# Patient Record
Sex: FEMALE | Race: WHITE | NOT HISPANIC OR LATINO | Employment: FULL TIME | URBAN - METROPOLITAN AREA
[De-identification: names, ages, dates, MRNs, and addresses within clinical notes are randomized per-mention and may not be internally consistent; named-entity substitution may affect disease eponyms.]

---

## 2024-03-31 ENCOUNTER — HOSPITAL ENCOUNTER (OUTPATIENT)
Facility: CLINIC | Age: 54
Setting detail: OBSERVATION
Discharge: PSYCHIATRIC OR SUBSTANCE USE FACILITY | End: 2024-04-02
Attending: EMERGENCY MEDICINE | Admitting: NURSE PRACTITIONER
Payer: COMMERCIAL

## 2024-03-31 ENCOUNTER — TELEPHONE (OUTPATIENT)
Dept: BEHAVIORAL HEALTH | Facility: CLINIC | Age: 54
End: 2024-03-31

## 2024-03-31 DIAGNOSIS — F33.2 MDD (MAJOR DEPRESSIVE DISORDER), RECURRENT SEVERE, WITHOUT PSYCHOSIS (H): ICD-10-CM

## 2024-03-31 DIAGNOSIS — F41.1 GAD (GENERALIZED ANXIETY DISORDER): ICD-10-CM

## 2024-03-31 DIAGNOSIS — F43.10 PTSD (POST-TRAUMATIC STRESS DISORDER): ICD-10-CM

## 2024-03-31 DIAGNOSIS — R45.851 SUICIDAL IDEATION: ICD-10-CM

## 2024-03-31 LAB
ALBUMIN SERPL BCG-MCNC: 3.9 G/DL (ref 3.5–5.2)
ALP SERPL-CCNC: 83 U/L (ref 40–150)
ALT SERPL W P-5'-P-CCNC: 20 U/L (ref 0–50)
AMPHETAMINES UR QL SCN: NORMAL
ANION GAP SERPL CALCULATED.3IONS-SCNC: 10 MMOL/L (ref 7–15)
AST SERPL W P-5'-P-CCNC: 21 U/L (ref 0–45)
BARBITURATES UR QL SCN: NORMAL
BENZODIAZ UR QL SCN: NORMAL
BILIRUB SERPL-MCNC: 0.2 MG/DL
BUN SERPL-MCNC: 14.9 MG/DL (ref 6–20)
BZE UR QL SCN: NORMAL
CALCIUM SERPL-MCNC: 9 MG/DL (ref 8.6–10)
CANNABINOIDS UR QL SCN: NORMAL
CHLORIDE SERPL-SCNC: 104 MMOL/L (ref 98–107)
CREAT SERPL-MCNC: 1.14 MG/DL (ref 0.51–0.95)
DEPRECATED HCO3 PLAS-SCNC: 26 MMOL/L (ref 22–29)
EGFRCR SERPLBLD CKD-EPI 2021: 58 ML/MIN/1.73M2
ERYTHROCYTE [DISTWIDTH] IN BLOOD BY AUTOMATED COUNT: 12.6 % (ref 10–15)
FENTANYL UR QL: NORMAL
GLUCOSE SERPL-MCNC: 107 MG/DL (ref 70–99)
HCT VFR BLD AUTO: 36.6 % (ref 35–47)
HGB BLD-MCNC: 12.3 G/DL (ref 11.7–15.7)
MCH RBC QN AUTO: 32.7 PG (ref 26.5–33)
MCHC RBC AUTO-ENTMCNC: 33.6 G/DL (ref 31.5–36.5)
MCV RBC AUTO: 97 FL (ref 78–100)
OPIATES UR QL SCN: NORMAL
PCP QUAL URINE (ROCHE): NORMAL
PLATELET # BLD AUTO: 206 10E3/UL (ref 150–450)
POTASSIUM SERPL-SCNC: 3.9 MMOL/L (ref 3.4–5.3)
PROT SERPL-MCNC: 6.2 G/DL (ref 6.4–8.3)
RBC # BLD AUTO: 3.76 10E6/UL (ref 3.8–5.2)
SODIUM SERPL-SCNC: 140 MMOL/L (ref 135–145)
TSH SERPL DL<=0.005 MIU/L-ACNC: 4.13 UIU/ML (ref 0.3–4.2)
WBC # BLD AUTO: 7 10E3/UL (ref 4–11)

## 2024-03-31 PROCEDURE — G0378 HOSPITAL OBSERVATION PER HR: HCPCS

## 2024-03-31 PROCEDURE — 80053 COMPREHEN METABOLIC PANEL: CPT | Performed by: NURSE PRACTITIONER

## 2024-03-31 PROCEDURE — 84443 ASSAY THYROID STIM HORMONE: CPT | Performed by: NURSE PRACTITIONER

## 2024-03-31 PROCEDURE — 99285 EMERGENCY DEPT VISIT HI MDM: CPT

## 2024-03-31 PROCEDURE — 250N000013 HC RX MED GY IP 250 OP 250 PS 637: Performed by: NURSE PRACTITIONER

## 2024-03-31 PROCEDURE — 36415 COLL VENOUS BLD VENIPUNCTURE: CPT | Performed by: NURSE PRACTITIONER

## 2024-03-31 PROCEDURE — 99223 1ST HOSP IP/OBS HIGH 75: CPT | Performed by: NURSE PRACTITIONER

## 2024-03-31 PROCEDURE — 85027 COMPLETE CBC AUTOMATED: CPT | Performed by: NURSE PRACTITIONER

## 2024-03-31 PROCEDURE — 80307 DRUG TEST PRSMV CHEM ANLYZR: CPT | Performed by: NURSE PRACTITIONER

## 2024-03-31 RX ORDER — TRAZODONE HYDROCHLORIDE 100 MG/1
100 TABLET ORAL
Status: DISCONTINUED | OUTPATIENT
Start: 2024-03-31 | End: 2024-04-02 | Stop reason: HOSPADM

## 2024-03-31 RX ORDER — TRAZODONE HYDROCHLORIDE 100 MG/1
100 TABLET ORAL AT BEDTIME
COMMUNITY
Start: 2023-10-10

## 2024-03-31 RX ORDER — VENLAFAXINE HYDROCHLORIDE 150 MG/1
150 CAPSULE, EXTENDED RELEASE ORAL DAILY
COMMUNITY
Start: 2024-01-15

## 2024-03-31 RX ORDER — PROGESTERONE 100 MG/1
100 CAPSULE ORAL DAILY
COMMUNITY
Start: 2024-01-15 | End: 2025-01-14

## 2024-03-31 RX ORDER — TRAZODONE HYDROCHLORIDE 50 MG/1
50 TABLET, FILM COATED ORAL
Status: DISCONTINUED | OUTPATIENT
Start: 2024-03-31 | End: 2024-03-31

## 2024-03-31 RX ORDER — LEVOTHYROXINE SODIUM 125 UG/1
1 TABLET ORAL
COMMUNITY
Start: 2023-10-10

## 2024-03-31 RX ORDER — MIRTAZAPINE 7.5 MG/1
7.5 TABLET, FILM COATED ORAL AT BEDTIME
Status: DISCONTINUED | OUTPATIENT
Start: 2024-03-31 | End: 2024-04-02 | Stop reason: HOSPADM

## 2024-03-31 RX ORDER — ACETAMINOPHEN 325 MG/1
650 TABLET ORAL EVERY 6 HOURS PRN
Status: DISCONTINUED | OUTPATIENT
Start: 2024-03-31 | End: 2024-04-02 | Stop reason: HOSPADM

## 2024-03-31 RX ORDER — HYDROXYZINE HYDROCHLORIDE 25 MG/1
25 TABLET, FILM COATED ORAL EVERY 6 HOURS PRN
Status: DISCONTINUED | OUTPATIENT
Start: 2024-03-31 | End: 2024-04-02 | Stop reason: HOSPADM

## 2024-03-31 RX ORDER — VENLAFAXINE HYDROCHLORIDE 75 MG/1
75 CAPSULE, EXTENDED RELEASE ORAL DAILY
COMMUNITY
Start: 2024-01-15 | End: 2025-01-14

## 2024-03-31 RX ORDER — ESTRADIOL 0.07 MG/D
1 FILM, EXTENDED RELEASE TRANSDERMAL
COMMUNITY
Start: 2024-01-15 | End: 2025-01-14

## 2024-03-31 RX ORDER — MINOXIDIL 2.5 MG/1
1.25 TABLET ORAL DAILY
COMMUNITY

## 2024-03-31 RX ORDER — PROGESTERONE 100 MG/1
100 CAPSULE ORAL AT BEDTIME
Status: DISCONTINUED | OUTPATIENT
Start: 2024-03-31 | End: 2024-04-02 | Stop reason: HOSPADM

## 2024-03-31 RX ORDER — IBUPROFEN 600 MG/1
600 TABLET, FILM COATED ORAL EVERY 6 HOURS PRN
Status: DISCONTINUED | OUTPATIENT
Start: 2024-03-31 | End: 2024-04-02 | Stop reason: HOSPADM

## 2024-03-31 RX ADMIN — ACETAMINOPHEN 650 MG: 325 TABLET, FILM COATED ORAL at 16:06

## 2024-03-31 RX ADMIN — MIRTAZAPINE 7.5 MG: 7.5 TABLET, FILM COATED ORAL at 21:35

## 2024-03-31 RX ADMIN — PROGESTERONE 100 MG: 100 CAPSULE ORAL at 21:35

## 2024-03-31 RX ADMIN — MINOXIDIL 1.25 MG: 2.5 TABLET ORAL at 21:35

## 2024-03-31 RX ADMIN — VENLAFAXINE HYDROCHLORIDE 225 MG: 150 CAPSULE, EXTENDED RELEASE ORAL at 21:35

## 2024-03-31 ASSESSMENT — ACTIVITIES OF DAILY LIVING (ADL)
ADLS_ACUITY_SCORE: 35

## 2024-03-31 ASSESSMENT — COLUMBIA-SUICIDE SEVERITY RATING SCALE - C-SSRS
1. IN THE PAST MONTH, HAVE YOU WISHED YOU WERE DEAD OR WISHED YOU COULD GO TO SLEEP AND NOT WAKE UP?: YES
6. HAVE YOU EVER DONE ANYTHING, STARTED TO DO ANYTHING, OR PREPARED TO DO ANYTHING TO END YOUR LIFE?: NO
5. HAVE YOU STARTED TO WORK OUT OR WORKED OUT THE DETAILS OF HOW TO KILL YOURSELF? DO YOU INTEND TO CARRY OUT THIS PLAN?: YES
2. HAVE YOU ACTUALLY HAD ANY THOUGHTS OF KILLING YOURSELF IN THE PAST MONTH?: YES
3. HAVE YOU BEEN THINKING ABOUT HOW YOU MIGHT KILL YOURSELF?: YES
4. HAVE YOU HAD THESE THOUGHTS AND HAD SOME INTENTION OF ACTING ON THEM?: NO

## 2024-03-31 NOTE — ED NOTES
Patient brought over from ED triage.  She is here with suicidal ideation with a plan to overdose or drive the car into a bridge. She states she has felt this way for the last 2 months but something changed last Thursday where she did not think about her children any longer. She felt the depression and suicidal ideation became more acute. She also states that she has had chronic suicidal ideation since she was a child. She lives with her 11 and 13 year old children and her  is often out of town for work and will not be back until tomorrow afternoon. She felt like she could not wait until then. She does not have her own therapist.  Her primary care MD prescribes her medications.  She is pleasant and cooperative. She is tearful, depressed and anxious. Nursing and risk assessments completed.  Assessments reviewed with LMHP and physician. Video monitoring in progress, patient informed.  Admission information reviewed with patient. Patient given a tour of [facility] and instructions on using the facility. Questions regarding [facility] addressed. Pt search completed and belongings inventoried.

## 2024-03-31 NOTE — ED TRIAGE NOTES
Comes into with SI with plan (drive into something, or over dose on meds she has at home), today she stated that she had been down and depressed for about 2 months but has been SI for years with no history of attempts.  She is her with her brother and is seeking EMPATH, she is tearful in triage.     *She fell on Friday and injured her back while walker her dog.       Triage Assessment (Adult)       Row Name 03/31/24 1401          Triage Assessment    Airway WDL WDL        Respiratory WDL    Respiratory WDL WDL        Cardiac WDL    Cardiac WDL WDL        Cognitive/Neuro/Behavioral WDL    Cognitive/Neuro/Behavioral WDL WDL                   [facility]  ED to EMPATH Checklist:      Goal for EMPATH: Depression management, Anxiety management, Suicidality, and Medication management    Current Behavior: Anxious    Safety Concerns: Suicidal, with a plan to drive rher car into something, or overdose with meds she has    Legal Hold Status: Voluntary    Medically Cleared by ED provider: Yes    Patient Therapeutically Searched: Not searched - Currently in triage    Belongings: Remain with patient    Independent Ambulation at Baseline: Yes/No: Yes    Participates in Care/Conversation: Yes/No: Yes    Patient Informed about EMPATH: Yes/No: Yes    DEC: Ordered and pending    Patient Ready to be Transferred to EMPATH? Yes/No: Yes

## 2024-03-31 NOTE — ED PROVIDER NOTES
[facility] Unit - Initial Psychiatric Observation Note  [facility] Emergency Department  Observation Initiation Date: Mar 31, 2024    Eduarda Pearl MRN: [id number]   Age: 53 year old YOB: 1970     History     Chief Complaint   Patient presents with    Suicidal     HPI  Eduarda Pearl is a 53 year old female with a past history notable for major depressive disorder, generalized anxiety disorder, PTSD related to childhood trauma, and binge-restrict eating disorder. Patient presented to the emergency department for evaluation of worsening symptoms of depression and PTSD, leading to thoughts of suicide with a plan to drive her car off a bridge or overdose on medication. Patient was medically evaluated in the emergency department and determined to be medically stable for transfer to [facility] for further psychiatric assessment. Patient is nearing 3 hours in emergency care.     Here at [facility], patient describes an incremental worsening of mood symptoms since around October 2023. She reports that she has felt increasingly depressed, sad, with low self-esteem, feelings of worthlessness, hopelessness, decreased concentration, anhedonia. She is restricting her intake more frequently. Reports she has been receiving Ozempic injections, which has naturally decreased her appetite over the past year. She describes history of binge-restrict eating pattern, for which she received treatment at [facility] in 2018. She notes a history of PTSD symptoms related to significant childhood trauma, and feels her symptoms have been resurfacing recently. Her father is apparently at the end of his life, and this has brought up some difficult memories. She endorses increased hypervigilance, intrusive memories, flashbacks, and more frequent nightmares. She endorses difficulty falling and staying asleep, reporting she is waking up around 0300 each night with racing thoughts and anxiety. She eventually falls back to sleep, but will then  "experience nightmares. She wakes up feeling anxious and not well-rested. She has begun to experience active suicidal thoughts, thinking about ways she could end her life, such as overdosing or driving car off a structure. Her children previously served as her reason to continue living, but this desire to live for them has been waning. Her  works as a  and is currently out of town. She has been prescribed venlafaxine for the past 4-5 years, which has generally been effective, but recently has not been adequately managing symptoms. Pt is agreeable to inpatient psychiatric hospitalization for further stabilization.     Past Medical History  No past medical history on file.  No past surgical history on file.  estradiol (VIVELLE-DOT) 0.075 MG/24HR BIW patch  levothyroxine (SYNTHROID/LEVOTHROID) 125 MCG tablet  minoxidil (LONITEN) 2.5 MG tablet  progesterone (PROMETRIUM) 100 MG capsule  Semaglutide, 1 MG/DOSE, (OZEMPIC) 4 MG/3ML pen  traZODone (DESYREL) 100 MG tablet  venlafaxine (EFFEXOR XR) 150 MG 24 hr capsule  venlafaxine (EFFEXOR XR) 75 MG 24 hr capsule      Allergies   Allergen Reactions    Erythromycin     Sulfa Antibiotics Unknown     Family History  No family history on file.  Social History       Past medical history, past surgical history, medications, allergies, family history, and social history were reviewed with the patient. No additional pertinent items.       Review of Systems  A medically appropriate review of systems was performed with pertinent positives and negatives noted in the HPI, and all other systems negative.    Physical Examination   BP: (!) 155/89  Pulse: 97  Temp: 97.7  F (36.5  C)  Resp: 12  Height: 160 cm (5' 3\")  Weight: 95.3 kg (210 lb)  SpO2: 99 %    Physical Exam  General: Appears stated age.   Neuro: Alert and fully oriented. Extremities appear to demonstrate normal strength on visual inspection.   Integumentary/Skin: no rash visualized, normal color    Psychiatric " Examination   Appearance: awake, alert, adequately groomed, appeared as age stated, and casually dressed  Attitude:  cooperative  Eye Contact:  fair  Mood:  depressed  Affect:  mood congruent and intensity is normal  Speech:  clear, coherent and normal prosody  Psychomotor Behavior:  no evidence of tardive dyskinesia, dystonia, or tics  Thought Process:  linear and goal oriented  Associations:  no loose associations  Thought Content:  no evidence of psychotic thought, active suicidal ideation present, no auditory hallucinations present, no visual hallucinations present, and no homicidal thinking  Insight:  fair  Judgement:  fair  Oriented to:  time, person, and place  Attention Span and Concentration:  fair  Recent and Remote Memory:  intact  Language: able to name/identify objects without impairment  Fund of Knowledge: intact with awareness of current and past events    ED Course        Labs Ordered and Resulted from Time of ED Arrival to Time of ED Departure - No data to display    Assessments & Plan (with Medical Decision Making)   Patient presenting with suicidal thinking in the context of worsening depressive symptoms, along with an exacerbation of PTSD symptoms, leading to more difficulty functioning. Protective mechanisms, such as her children, have become less of a consideration. Has been treated with venlafaxine for the last several years, which has not been adequately managing symptoms. She is willing to transition to inpatient psychiatry for further stabilization.  Nursing notes reviewed noting no acute issues.     I have reviewed the assessment completed by the [facility].     During the observation period, the patient did not require medications for agitation, and did not require restraints/seclusion for patient and/or provider safety.     The patient was found to have a psychiatric condition that would benefit from an observation stay in the emergency department for further psychiatric stabilization and/or  coordination of a safe disposition. The observation plan includes serial assessments of psychiatric condition, potential administration of medications if indicated, further disposition pending the patient's psychiatric course during the monitoring period.     Preliminary diagnosis:    ICD-10-CM    1. Suicidal ideation  R45.851       2. MDD (major depressive disorder), recurrent severe, without psychosis (H)  F33.2       3. PTSD (post-traumatic stress disorder)  F43.10       4. VASU (generalized anxiety disorder)  F41.1            Treatment Plan:  - Continue venlafaxine  mg daily for treatment of anxiety and depressive disorders  - Trial mirtazapine 7.5 mg at bedtime for sleep, appetite, mood augmentation  - Change trazodone from nightly to PRN. Pt has not found this to be effective recently.   - Hydroxyzine 25 mg q6h prn for anxiety  - Alternate acetaminophen and ibuprofen for back pain  - CBC, CMP, TSH, and utox ordered to help facilitate inpatient admission  - Patient will be registered to observation status. Plan for transfer to inpatient psychiatry when a bed is available.       --  Manuel Haas CNP   [facility] EMERGENCY DEPT  [facility] Unit      Manuel Haas CNP  03/31/24 4488

## 2024-03-31 NOTE — ED PROVIDER NOTES
History     Chief Complaint:  Suicidal       HPI   Eduarda Pearl is a 53 year old female with history of anxiety and depression presenting today with suicidal ideations.  She states that she has had ongoing depression that she has been addressing with her primary care doctor.  She states that she has had increasing suicidal ideations over the last 2 months.  She states that she has had suicidal plan including drug overdose and driving into something.  She states that she has 2 children at home and 11-year-old and a 13-year-old.  Her  works as a  and is currently out of town.  She states that she was trying to hold off until her  was home however her symptoms became too severe.  She was brought in by her brother.  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  She reports insomnia at night has been taking trazodone with no improvement.  She is currently on Ozempic and has been taking it over the last year and a half and states that she has baseline decreased appetite with that.  She denies any daily alcohol use.  Reports occasional marijuana use.  Her father has a history of depression and states that her mother  from plastic surgery when she was 19 years old.  She has had no prior [facility] admissions or inpatient psych admissions.      Independent Historian:   None - Patient Only    Review of External Notes:   Last visit with PCP was in January 15, 2024 patient diagnosed with anxiety, hypothyroidism.      Medications:    No current outpatient medications on file.      Past Medical History:    No past medical history on file.    Past Surgical History:    No past surgical history on file.     Physical Exam   Patient Vitals for the past 24 hrs:   BP Pulse Resp SpO2 Weight   24 1400 (!) 155/89 97 12 99 % 95.3 kg (210 lb)        Physical Exam  Vitals reviewed.   Constitutional:       General: She  is not in acute distress.     Appearance: She is not ill-appearing.   HENT:      Head: Normocephalic and atraumatic.   Eyes:      Extraocular Movements: Extraocular movements intact.   Cardiovascular:      Rate and Rhythm: Normal rate.   Pulmonary:      Effort: Pulmonary effort is normal. No respiratory distress.   Musculoskeletal:      Cervical back: Normal range of motion.   Skin:     General: Skin is warm and dry.   Neurological:      Mental Status: She is alert and oriented to person, place, and time.      GCS: GCS eye subscore is 4. GCS verbal subscore is 5. GCS motor subscore is 6.   Psychiatric:      Comments: Suicidal ideations with plan. No hallucinations.           Emergency Department Course     Imaging:  No orders to display          Laboratory:  Labs Ordered and Resulted from Time of ED Arrival to Time of ED Departure - No data to display     Procedures       Emergency Department Course & Assessments:    Interventions:  Medications - No data to display            Social Determinants of Health affecting care:   None    Disposition:  The patient was transferred to [facility].     Impression & Plan      Medical Decision Makin-year-old female history of anxiety and depression presenting today with suicidal ideations.  She has history of major depressive disorder has been on medications as prescribed by her PCP.  She states that over the last 2 months she has had worsening symptoms.  She states that she currently has suicidal ideations with a plan to overdose on drugs or drive into something.  She states she has no prior inpatient psych admissions.  At this time she is tearful.  [facility]odynam[facility]      Diagnosis:    ICD-10-CM    1. Suicidal ideation  R45.851                Fabiola Clark DO  3/31/2024   Fabiola Clark DO Doan, Tiffani, DO  [phone]

## 2024-03-31 NOTE — TELEPHONE ENCOUNTER
S: JESSICA Ahumada  Alisson  calling at 5:34 PM about a 53 year old/Female presenting with acute SI with plans to overdose on meds or crash her car     B: Pt arrived via Family. Presenting problem, stressors: hx of childhood trauma that recently resurfaced on visit their mom's side of the family, father dying of cancer, very tumltuous work life    Pt affect in ED: Flat  Pt Dx: Major Depressive Disorder, Generalized Anxiety Disorder, and PTSD  Previous [facility] hx? No  Pt endorses SI with a plan to overdose or crash car     Hx of suicide attempt? No  Pt denies SIB  Pt denies HI   Pt denies hallucinations .   Pt RARS Score: 3    Hx of aggression/violence, sexual offenses, legal concerns, Epic care plan? describe: No  Current concerns for aggression this visit? No  Does pt have a history of Civil Commitment? No  Is Pt their own guardian? Yes    Pt is prescribed medication. Is patient medication compliant? Yes  Pt endorses OP services: Medication Management  CD concerns: None  Acute or chronic medical concerns: No  Does Pt present with specific needs, assistive devices, or exclusionary criteria? None      Pt is ambulatory  Pt is able to perform ADLs independently      A: Pt to be reviewed for [facility] admission. Pt is Voluntary  Preferred placement: [facility]    COVID Symptoms: No  If yes, COVID test required   Utox: Ordered, not yet collected   CMP: Ordered, not yet collected   CBC: Ordered, not yet collected   HCG: N/A    R: Patient cleared and ready for behavioral bed placement: Yes  Pt placed on [facility] worklist? Yes    Does Patient need a Transfer Center request created? Yes, writer completed Transfer Center request at:

## 2024-03-31 NOTE — ED NOTES

## 2024-03-31 NOTE — TELEPHONE ENCOUNTER
R: [place]  Access Inpatient Bed Call Log  3/31/2024 3:34 PM  Intake has called facilities that have not updated their bed status within the last 12 hours.??      ADULTS:     *[facility]  [place] -- [facility]: @ cap per website.  [place] -- [facility]:  @ cap per website.  [place] -- [facility]: @ Posting 1 bed.  [place] -- [facility]: @ cap per website. -3:35 PM Per Tess, they are capped.  [place] -- [facility]: @ Cap per website.  [facility] -- [facility]: @ cap per website.  [place] -- [facility]/YA beds @ Posting 1 beds. Ages 18-28, Voluntary only, COVID test req'd, NO aggression, physical or sexual assault, violence hx or drug abuse, or psychosis  [place] -- [facility]: @ Cap per website.  [place] -- [facility]: @ cap per website.  [place] -- [facility]:  @ Cap per website. - 12:16 AM Per Lorenzo they are at capacity.    Pt remains on waitlist pending appropriate placement availability.
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
## 2024-04-01 ENCOUNTER — TELEPHONE (OUTPATIENT)
Dept: BEHAVIORAL HEALTH | Facility: CLINIC | Age: 54
End: 2024-04-01
Payer: COMMERCIAL

## 2024-04-01 VITALS
HEIGHT: 63 IN | RESPIRATION RATE: 14 BRPM | DIASTOLIC BLOOD PRESSURE: 86 MMHG | OXYGEN SATURATION: 96 % | SYSTOLIC BLOOD PRESSURE: 133 MMHG | WEIGHT: 218 LBS | HEART RATE: 81 BPM | TEMPERATURE: 98 F | BODY MASS INDEX: 38.62 KG/M2

## 2024-04-01 PROCEDURE — 250N000013 HC RX MED GY IP 250 OP 250 PS 637: Performed by: NURSE PRACTITIONER

## 2024-04-01 PROCEDURE — G0378 HOSPITAL OBSERVATION PER HR: HCPCS

## 2024-04-01 RX ORDER — ESTRADIOL 0.07 MG/D
1 FILM, EXTENDED RELEASE TRANSDERMAL
Status: DISCONTINUED | OUTPATIENT
Start: 2024-04-01 | End: 2024-04-02 | Stop reason: HOSPADM

## 2024-04-01 RX ADMIN — MIRTAZAPINE 7.5 MG: 7.5 TABLET, FILM COATED ORAL at 22:02

## 2024-04-01 RX ADMIN — ESTRADIOL 1 PATCH: 0.07 FILM, EXTENDED RELEASE TRANSDERMAL at 22:03

## 2024-04-01 RX ADMIN — VENLAFAXINE HYDROCHLORIDE 225 MG: 150 CAPSULE, EXTENDED RELEASE ORAL at 22:02

## 2024-04-01 RX ADMIN — PROGESTERONE 100 MG: 100 CAPSULE ORAL at 22:02

## 2024-04-01 RX ADMIN — HYDROXYZINE HYDROCHLORIDE 25 MG: 25 TABLET, FILM COATED ORAL at 22:22

## 2024-04-01 RX ADMIN — MINOXIDIL 1.25 MG: 2.5 TABLET ORAL at 22:03

## 2024-04-01 RX ADMIN — LEVOTHYROXINE SODIUM 125 MCG: 75 TABLET ORAL at 09:08

## 2024-04-01 RX ADMIN — HYDROXYZINE HYDROCHLORIDE 25 MG: 25 TABLET, FILM COATED ORAL at 12:45

## 2024-04-01 RX ADMIN — ACETAMINOPHEN 650 MG: 325 TABLET, FILM COATED ORAL at 22:14

## 2024-04-01 NOTE — ED NOTES
Patient slept late.  Patient said she slept well.  She states she feels worn out and just feels numb.  When asked about if she was having suicidal thoughts she said she is too numb to think about it. She is wondering how things are going at home but is happy not have the responsibility of home.  She states she is feeling at edge.  She was given 25mg of hydroxyzine and now states she feels relaxed.

## 2024-04-01 NOTE — PROGRESS NOTES
"Triage & Transition Services, Extended Care     Therapy Progress Note    Patient: Eduarda goes by \"Eduarda,\" uses she/her pronouns  Date of Service: April 1, 2024  Site of Service: [facility] EMERGENCY DEPT                             EMP14  Patient was seen yes  Mode of Assessment: In person    Presentation Summary: Pt tells this writer that she has been sleeping excessively throughout the day and did not know that she could sleep that much. She reports feeling numb like she is \"in a little pod observing the world around her\". This writer provided psychoeducation to pt about derealization, which resonated with her. She continues to feel acutely suicidal and is unable to identify reasons to continue living. She reports feeling very \"distant\" and \"far away\" from her  and children. She tells this writer that her relationships with her children are changing as they move into adolescence and, in some ways, she does not feel that they need her anymore. She has been continuing to think of ways to end her life and has a new suicidal plan to drown herself in a lake like a neighbor did. She tells this writer that it is \"too overwhelming to sit with the pain\". Pt has had conversations with her brother and  while here. She feels well supported by her brother and notes feeling \"awful\" around how her  has responded to her being in the hospital. She tells this writer that she feels \"invalidated\" by his responses to her pain. Pt continues to be agreeable to voluntary admission.    Therapeutic Intervention(s) Provided: Engaged in guided discovery, explored patient's perspectives and helped expand them through socratic dialogue.    Current Symptoms: anxious, racing thoughts, excessive worry negativistic, crying or feels like crying, low self esteem, helplessness, hoplessness, sadness, thoughts of death/suicide racing thoughts, excessive worry, anxious        Mental Status Exam   Affect: " Flat  Appearance: Appropriate  Attention Span/Concentration: Attentive  Eye Contact: Engaged    Fund of Knowledge: Appropriate   Language /Speech Content: Fluent  Language /Speech Volume: Normal  Language /Speech Rate/Productions: Normal  Recent Memory: Intact  Remote Memory: Intact  Mood: Anxious, Depressed, Sad  Orientation to Person: Yes   Orientation to Place: Yes  Orientation to Time of Day: Yes  Orientation to Date: Yes     Situation (Do they understand why they are here?): Yes  Psychomotor Behavior: Normal  Thought Content: Suicidal  Thought Form: Obsessive/Perseverative    Treatment Objective(s) Addressed: processing feelings, assessing safety    Patient Response to Interventions: acceptance expressed, verbalizes understanding    Progress Towards Goals: Patient Reports Symptoms Are: ongoing  Patient Progress Toward Goals: is not making progress  Comment: Pt continues to report suicidal ideation and has a new plan today to drown herself in a lake.  Next Step to Work Toward Discharge: symptom stabilization  Symptom Stabilization Comment: Pt continues to report suicidal ideation and has a new plan today to drown herself in a lake. She is unable to identify any deterrents to suicide.    Case Management: No case management completed today.     Plan: inpatient mental health  yes provider, SIMONE Vega, in agreement  yes    Clinical Substantiation: It is the recommendation of this writer that pt be admitted to an inpatient psychiatric unit on the basis of her suicidal plans with intent to overdose on medication or crash her car. She also has a new suicidal plan today to drown herself in a lake. Pt is unable to identify any deterrents to suicide at this point in time, including her children. Pt is voluntary for inpatient hospitalization.    Legal Status: Legal Status at Admission: Voluntary/Patient has signed consent for treatment    Session Status: Time session started: 1625  Time session ended: 1645  Session  Duration (minutes): 20 minutes  Session Number: 1  Anticipated number of sessions or this episode of care: 3    Time Spent: 20 minutes    CPT Code: CPT Codes: 65097 - Psychotherapy (with patient) - 30 (16-37*) min    Diagnosis:   Patient Active Problem List   Diagnosis Code    Suicidal ideation R45.851    Severe recurrent major depression without psychotic features (H) F33.2    Posttraumatic stress disorder F43.10    Generalized anxiety disorder F41.1       Primary Problem This Admission: Active Hospital Problems    Suicidal ideation      Severe recurrent major depression without psychotic features (H)      Posttraumatic stress disorder      Generalized anxiety disorder        Alisson Teixeira [facility]   Licensed Mental Health Professional (LMHP), [facility] Care  [phone]

## 2024-04-01 NOTE — PROGRESS NOTES
Triage & Transition Services, Extended Care     Client Name: Eduarda Pearl    Date: March 31, 2024    Patient was seen    Mode of Assessment:      Service Type: (P) other (see comments) (pt declined to attend, expressed need for sleep, expressed openness to group tomorrow)  Session Start Time:  (P) 1840    Session End Time: (P) 1910  Session Length: (P) 30  Site Location: [facility] EMERGENCY DEPT                             EMP14  Total Number ofAttendees: (P) 1  Topic:   (P) coping skills/lifestyle management   Response: (P) other (see comments) (pt declined to participate)     Alisson Teixeira [facility]   Licensed Mental Health Professional (LMHP), Extended Care  [phone]

## 2024-04-01 NOTE — TELEPHONE ENCOUNTER
[place]: [place]  Access Inpatient Bed Call Log  4/1/24 @ 1:00am   Intake has called facilities that have not updated their bed status within the last 12 hours.??     *METRO:  [facility] -- [facility]: @ cap per website.  [facility]:  @ cap per website.  [facility] -- [facility]: @ cap per website.  [place] -- [facility]: @ cap per website. Low acuity only.  [place] -- [facility]: @ cap per website.  [facility] -- [facility]: @ cap per website.   [facility]/ beds - POSTING 1 BED. Ages 18-28, Voluntary only, NO aggression/physical/sexual assault, violence hx or drug abuse, or psychosis. Negative Covid.   [facility] -- [facility]: @ cap per website.  [place] [place] -- [facility]: @ cap per website.  [place] -- [facility]: @ cap per website. Not reviewing until 10AM.     Pt remains on waitlist pending appropriate placement availability

## 2024-04-01 NOTE — TELEPHONE ENCOUNTER
[place]: [place]  Access Inpatient Bed Call Log 4/1/24 7:30 AM    Intake has called facilities that have not updated the bed status within the last 12 hours.                 Adults:           [facility] is at capacity            [facility] is posting 0 beds. [phone]    [facility] is posting 0 bed. Negative covid required. [phone]        [facility] is posting 0 beds. Neg covid. No high school/[facility]. [phone] Per call at 7:39a to [facility].   [facility] is posting 0 beds. [phone]   [facility] is posting 0 beds. [phone]    [facility] is posting 1 bed. Negative covid. [phone]. Per call at 7:40a to [facility] 1 child, handful of adolescents and 1 YA bed available.   [facility] [place] ([facility]) is posting 0 bed [phone]     Pt remains on the work list pending appropriate bed availability.      1:35 PM Paged Carmela to review pt for [facility]  2:48 PM 2nd page to Carmela   2:58 PM Carmela called. He will review pt and call back.   3:07 PM Carmela accepted pt for admission to [facility].   3:39 PM Called [facility]. ELLYN still in report. Left a message to have them call intake.   4:01 PM Called [facility] and spoke with CRN to inform of pt in queue. He states that there are two admissions ahead of pt, one being an acute 2:1. He reports he will review pt and assess if he will be able to admit this shift.   4:10 PM Called [facility] and provided pt placement information

## 2024-04-01 NOTE — PROGRESS NOTES
Pt has been calm, pleasant and cooperative all shift. Pt was isolative and withdrawn. She spent most of the shift resting in her recliner watching TV. Pt reports some feeling of SI with no plan. Pt was compliant with medications and cares. Plan is to go IP. Pt  is agreeable with the plan.

## 2024-04-01 NOTE — H&P
"  ----------------------------------------------------------------------------------------------------------  [facility]   Psychiatry History and Physical    Name: Eduarda Pearl   MRN#: [id number]  Age: 53 year old YOB: 1970    Date of Admission:  24  Attending Physician: Dr. Adri Hendricks     Contacts:     Primary Outpatient Psychiatrist: None per chart review  Primary Physician: Starla Cramer  Therapist: None per chart review  [facility] : None per chart review  Probation/: None per chart review  Family Members: Jase Fox (brother) [phone]            Twan Pearl () [phone]     Chief Concern:     \"Suicidal Ideation\"     History of Present Illness:     Eduarda Pearl is a 53 year old female with previous psychiatric diagnoses of MDD, VASU, PTSD, and binge-restrict eating disorder admitted from the [facility] on 2024 due to concern for SI in the context of psychosocial stressors including work related stress, sick family members .    ED Provider Note:  Eduarda Pearl is a 53 year old female with history of anxiety and depression presenting today with suicidal ideations.  Patient has had ongoing depression that she has been addressing with her primary care doctor, but worsening over the last two months.  She states that she has had suicidal plan including drug overdose and driving into something.  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  She reports insomnia at night has been taking trazodone with no improvement.  She denies any daily alcohol use.  Reports occasional marijuana use.  Her father has a history of depression and states that her mother  from plastic surgery when she was 19 years old.  She has had no prior empath admissions or inpatient psych admissions.  Per ED MD H&P.  See ED note from 3/31/24 " "for more details.    [facility]/DEC Assessment:  Eduarda Pearl presents to the ED with with her brother for worsening suicidal ideation, Depression, Anxiety.  She reported often has \"low grade depression\" that began to significantly worsen in October 2023. She identifies multiple psychosocial stressors including: experiencing menopause, an unpredictable workplace, a new job with increased demands for less pay, and spending time with her mother's side of the family which resurfacing childhood trauma. Her father is also dying of prostate cancer, which has further increased PTSD symptoms related to her childhood trauma.  On Thursday (3/28/24), she started to have intense thoughts about how \"easy\" it would be to end her life and drove to her brother's house for help. Her brother and sister in-law have been monitoring her SI and encouraged her to seek help today. She has suicidal plans with intent to overdose on medication or crash her car. She has been researching this week what combination of medications she would need to take for a suicide attempt to be lethal. She also has been thinking about driving fast down a remote road so as to not hurt anyone else and then crashing into a barricade. She also has a fantasy of putting on a fur coat, combining barbiturates with vodka, and then floating away on an ice sheet in the [place], letting hypothermia take her. She is unable to identify any deterrents to suicide at present, including her children. She talks at length about how she cannot \"handle the pain\" anymore and does not think that life will ever get better. Pt endorses PTSD symptoms of \"horrific nightmares\", flashbacks, diassociation, and hypervigilance. She reports racing thoughts without associated panic attacks. She has been having significant difficulty sleeping, falling asleep at 1 am and then waking up between 3 and 5 am. She has a history of binging and restricting and has been restricting more frequently. She denies " "SARAHY but reports using marijuana on a monthly basis or less. She denies HI.  Collateral information was obtained from her brother, Jase Fox:  Pt has had significant depressive symptoms since at least 3/28/24 and her family decided to seek help for her today.  Pt has struggled with depression for a long time but Jase has never seen pt \"as bad as she has been\" since Thursday (3/28/24). She has been crying uncontrollably and telling us that we would be better off without her. She has not disclosed a suicidal plan.  No SARAHY concerns. Pt has made comments about killing herself.  Per 3/31/24 DEC assessment by [facility].  See note from 3/31/24 for more details.    ED/Hospital Course:  Eduarda Pearl was medically cleared for admission to inpatient psychiatric unit. In the ED, she was calm and cooperative with staff with continued SI and required only her routine medications.    Patient interview:  Pt interviewed in her room today with treatment team. She is \"ok\" today. Denies SI today. Has had SI on and off throughout life, no prior attempts. Effexor for many years, previously tried cymbalta and wellbutrin. Wellbutrin was stopped because she was crying all the time. Feels her depressive episode has been gradual in onset. Tried to decrease effexor dose but had recurrent depressive symptoms. Feels effexor is helpful but dont resolve her depression. Started on effexor 10/2020.     Since having a covid infection, feels she has not recovered fully, and this has worsened her depression. Work is stressful. 2 kids 11 and 14,  is gone frequently as a . Emotional needs of the family fall on her shoulders. Work stress was a trigger.    Works at [employer], she loves job but she received an email in October 2023 that was upsetting and then multiple changes at work happened, she feels her depressive symptoms have been slowly getting worse since then.     Endorses anxiety, racing thoughts, ruminations. Tried ativan " "after covid infection triggered her anxiety. Has ativan at home but rarely takes it. Effexor does not help anxiety.     She went on vacation to [place] last week and she went to see mom's side of the family which was an additional trigger for her because her mom  with she was 18.     History of eating disorder, binging and restricting, received treatment [id number]. Started on ozempic, binging behavior has decreased with this med but still present. Binging behavior is more related to stress and anxiety, vs her depression.     Goals for hospitalization would be med stabilization and intensive outpatient therapy. Wants to address her PTSD as this is a trigger and impacts her ability to regulate her emotions. Endorses nightmares related to past trauma, happening \"a lot\" right now, when she is \"doing ok\" she will have nightmares about once a month, currently happening every night right now, and this wakes her from sleep. Denies avoiding emotional triggers. Sensitivity to loud noises, easily startled, hypervigilent in awareness of surroundings. Low self esteem and body dysmorphia, has struggled with this whole life.     Support -  (Twan), 2 kids, brother, sister in law, best friend.  is home with kids now. Brother is going to help watch kids when  goes back to work. Doing couples counseling with . Last CBT .    Verbal CHRISTA for her PCP.      Psychiatric Review of Systems:    All negative except what is noted in the HPI.      Medical Review of Systems:   The Review of Systems is negative other than what is noted in the HPI.     Psychiatric History:     Prior diagnoses: Previous psychiatric diagnoses include MDD, VASU, PTSD, and binge-restrict eating disorder.    Hospitalizations: None.    Court Commitments: None per Chart Review    Suicide attempts: None per Chart Review.    Self-injurious behavior: None per Chart Review.    Violence towards others: None per Chart Review.    ECT/TMS: None " per Chart Review.    Past medications:   Per Chart Review:  Trazodone  Effexor XR  Ativan 0.5mg every day prn   Cymbalta   Wellbutrin    Substance Use History:     Alcohol: Endorses Social.      Nicotine: Denies    Cannabis: Endorses, last use 2/21/24    Illicit Substances: Denies  current or past addiction    Chemical Dependency Treatment: Denies history of chemical dependency treatment      Social History:     Family/Relationships:     Living Situation: Home    Education: Not formally assessed today.    Occupation: Works full-time in sales for [employer].    Legal: Denies history of legal issues.     Guns: Not formally assessed today.    Abuse/Trauma: Endorses history of child trauma        Past Medical/Surgical History:     I have reviewed this patient's past medical history  No past medical history on file.    This patient has no significant past surgical history  No past surgical history on file.     Family History:     Psychiatric Family Hx: Depression: father  No family history on file.     Allergies:      Allergies   Allergen Reactions     Erythromycin      Sulfa Antibiotics Unknown        Medications:   Estradiol 0.075mg/Hr Patch Thursday and Monday morning  Levothyroxine 125mcg once daily  Minoxidil 2.5mg once daily  Progesterone 100mg once daily  Semaglutide 1mg/dose every 7 days  Trazodone 100mg at bedtime  Venlafaxine 150mg 24hr daily  Venlafaxine 75mg 24hr daily  See current inpatient medications below.     Vitals and Physical Exam:     BP (!) 136/93 (BP Location: Right arm, Patient Position: Sitting, Cuff Size: Adult Large)   Pulse 71   Temp 97.4  F (36.3  C) (Oral)   Resp 16   Wt 97.9 kg (215 lb 14.4 oz)   SpO2 100%   BMI 38.24 kg/m      See ED assessment note by ED physician on 3/31/24.     Labs and Imaging:     Recent Results (from the past 72 hour(s))   Urine Drug Screen Panel    Collection Time: 03/31/24  6:47 PM   Result Value Ref Range    Amphetamines Urine Screen Negative Screen  Negative    Barbituates Urine Screen Negative Screen Negative    Benzodiazepine Urine Screen Negative Screen Negative    Cannabinoids Urine Screen Negative Screen Negative    Cocaine Urine Screen Negative Screen Negative    Fentanyl Qual Urine Screen Negative Screen Negative    Opiates Urine Screen Negative Screen Negative    PCP Urine Screen Negative Screen Negative   TSH with free T4 reflex    Collection Time: 03/31/24  8:53 PM   Result Value Ref Range    TSH 4.13 0.30 - 4.20 uIU/mL   CBC with platelets    Collection Time: 03/31/24  8:53 PM   Result Value Ref Range    WBC Count 7.0 4.0 - 11.0 10e3/uL    RBC Count 3.76 (L) 3.80 - 5.20 10e6/uL    Hemoglobin 12.3 11.7 - 15.7 g/dL    Hematocrit 36.6 35.0 - 47.0 %    MCV 97 78 - 100 fL    MCH 32.7 26.5 - 33.0 pg    MCHC 33.6 31.5 - 36.5 g/dL    RDW 12.6 10.0 - 15.0 %    Platelet Count 206 150 - 450 10e3/uL   Comprehensive metabolic panel    Collection Time: 03/31/24  8:53 PM   Result Value Ref Range    Sodium 140 135 - 145 mmol/L    Potassium 3.9 3.4 - 5.3 mmol/L    Carbon Dioxide (CO2) 26 22 - 29 mmol/L    Anion Gap 10 7 - 15 mmol/L    Urea Nitrogen 14.9 6.0 - 20.0 mg/dL    Creatinine 1.14 (H) 0.51 - 0.95 mg/dL    GFR Estimate 58 (L) >60 mL/min/1.73m2    Calcium 9.0 8.6 - 10.0 mg/dL    Chloride 104 98 - 107 mmol/L    Glucose 107 (H) 70 - 99 mg/dL    Alkaline Phosphatase 83 40 - 150 U/L    AST 21 0 - 45 U/L    ALT 20 0 - 50 U/L    Protein Total 6.2 (L) 6.4 - 8.3 g/dL    Albumin 3.9 3.5 - 5.2 g/dL    Bilirubin Total 0.2 <=1.2 mg/dL   Comprehensive metabolic panel    Collection Time: 04/02/24  8:04 AM   Result Value Ref Range    Sodium 143 135 - 145 mmol/L    Potassium 4.2 3.4 - 5.3 mmol/L    Carbon Dioxide (CO2) 23 22 - 29 mmol/L    Anion Gap 14 7 - 15 mmol/L    Urea Nitrogen 15.6 6.0 - 20.0 mg/dL    Creatinine 1.13 (H) 0.51 - 0.95 mg/dL    GFR Estimate 58 (L) >60 mL/min/1.73m2    Calcium 9.1 8.6 - 10.0 mg/dL    Chloride 106 98 - 107 mmol/L    Glucose 86 70 - 99 mg/dL  "   Alkaline Phosphatase 77 40 - 150 U/L    AST 25 0 - 45 U/L    ALT 24 0 - 50 U/L    Protein Total 6.7 6.4 - 8.3 g/dL    Albumin 4.1 3.5 - 5.2 g/dL    Bilirubin Total 0.4 <=1.2 mg/dL   Vitamin B12    Collection Time: 04/02/24  8:04 AM   Result Value Ref Range    Vitamin B12 273 232 - 1,245 pg/mL   Folate    Collection Time: 04/02/24  8:04 AM   Result Value Ref Range    Folic Acid 9.1 4.6 - 34.8 ng/mL   Hemoglobin A1c    Collection Time: 04/02/24  8:04 AM   Result Value Ref Range    Hemoglobin A1C 5.1 <5.7 %   Lipid panel    Collection Time: 04/02/24  8:04 AM   Result Value Ref Range    Cholesterol 260 (H) <200 mg/dL    Triglycerides 120 <150 mg/dL    Direct Measure HDL 66 >=50 mg/dL    LDL Cholesterol Calculated 170 (H) <=100 mg/dL    Non HDL Cholesterol 194 (H) <130 mg/dL        Mental Status Examination:     Oriented to:  Grossly Oriented  General:  Awake and Alert  Appearance:  appears stated age  Behavior/Attitude:  Calm, Cooperative, Engaged, and Open  Eye Contact: Appropriate  Psychomotor: Normal and No evidence of tics, dystonia, or tardive dyskinesia  no catatonia present  Speech:  appropriate volume/tone and with good articulation  Language: Fluent in English with appropriate syntax and vocabulary.  Mood:  \"I feel ok today\"  Affect:  appropriate and anxious  Thought Process:  linear, coherent, and goal directed  Thought Content:   No SI/HI/AH/VH; No apparent delusions  Associations:  intact  Insight:  fair due to recognizing her worsening symptoms and causes of them  Judgment:  good, offers up a reasonable plan for treatment she would like  Impulse control: good  Attention Span:  grossly intact  Concentration:  grossly intact  Recent and Remote Memory:  grossly intact  Fund of Knowledge:  Normal  Muscle Strength and Tone: normal  Gait and Station: Normal     Psychiatric Assessment:     Eduarda Pearl is a 53 year old female previously diagnosed with VASU, MDD, PTSD, and binge-restrict eating disorder who " presented voluntarily her brother in the context of worsening depression and suicidal ideation since October of 2023, now with a plan and intent. No prior psychiatric hospitalization. Significant symptoms on admission include suicidal ideation with plan and intent. Initial evaluation notable for a calm, cooperative patient with good insight.  Eduarda's mental health presentation is currently complicated by increased work stress, father with cancer, and recently visiting her family which brought up memories of childhood trauma.    In summary, the patient's reported symptoms of suicidal ideation in the context of having a plan and no longer feeling that her children are a reason to live are consistent with decompensation of current depression without psychotic features. After further evaluation, her anxiety symptoms and PTSD symptoms are prominent and are likely contributing to her  worsening her depression.   She will likely benefit medication augmentation as well and outpatient referral focused on trauma based therapy and anxiety. Patient warrants inpatient psychiatric hospitalization to maintain her safety.     Psychiatric Plan by Diagnosis   #VASU  # MDD without psychotic features   - Venlafaxine XR 225mg po daily  - Start Buspirone 10mg BID for augmentation  - Hydroxyzine 25mg po  q6h PRN    # PTSD   - Start Prazosin 1mg at bedtime    # Insomnia  - Trazodone 100mg PRN     Pertinent Labs/Monitoring:   - None     Additional Plans:  - Patient will be treated in therapeutic milieu with appropriate individual and group therapies as described     Psychiatric Hospital Course:    Eduarda Pearl was admitted to [facility] as a voluntary patient for suicidal ideation.  Her PTA meds were continued, including her Venlafaxine XR 225mg for anxiety and depression, hydroxyzine 25mg q6h PRN for anxiety, and trazodone PRN for sleep.  Her symptoms of PTSD were prominent, reporting increased and nightmares and she was started on  Prazosin 1mg at bedtime. She reported benefit with venlafaxine in the past but noted that when she decided to decrease the medication she noticed worsening symptoms so we decided to augment venlafaxine with Buspar 10mg BID to address both depressive and anxiety symptoms.      Medical Assessment and Plan   Medical diagnoses to be addressed this admission:      # Hypothyroidism  - PTA Levothyroxine 125mcg daily    Medical course: Patient was physically examined by the ED prior to being transferred to the unit and was found to be medically stable and appropriate for admission.     Consults:  none     Checklist     Legal Status: Orders Placed This Encounter      Voluntary    Safety Assessment:   Behavioral Orders   Procedures     Code 1 - Restrict to Unit     Routine Programming     As clinically indicated     Status 15     Every 15 minutes.     Suicide precautions: Suicide Risk: LOW; Clinical rationale to override score: Exhibiting Suicidal/self-harm behaviors or thoughts     Patients on Suicide Precautions should have a Combination Diet ordered that includes a Diet selection(s) AND a Behavioral Tray selection for Safe Tray - with utensils, or Safe Tray - NO utensils       Order Specific Question:   Suicide Risk     Answer:   LOW     Order Specific Question:   Clinical rationale to override score:     Answer:   Exhibiting Suicidal/self-harm behaviors or thoughts       Risk Assessment:  Risk for harm is low.  Risk factors: SI and trauma  Protective factors: family and engaged in treatment     SIO: No    Dispo: TBD. Disposition pending clinical stabilization, medication optimization and development of an appropriate discharge plan.     Attestations:     Dean Cassidy, MS3  [facility] Medical Student        I was present with the medical student who participated in the service and documentation of the note. I have verified the history and personally performed the physical/mental status exam and medical decision making. I agree  with the assessment and plan documented in the note. Patient seen and discussed with attending physician, Dr. Adri Hendricks, who is in agreement with my assessment and plan.    Jose Cunningham D.O.  Psychiatry Resident  [facility]     This patient has been seen and evaluated by me, Adri Hendricks DO.  I have discussed this patient with the team including the resident and medical student(s) and I agree with the findings and plan in this note.  Dr. Adri Hendricks DO, [facility]      Addendum 4/3/24: Adjusted H&P to be under Psychiatry service rather than Neurology. No other changes.   Elisabet Min MD

## 2024-04-02 ENCOUNTER — HOSPITAL ENCOUNTER (INPATIENT)
Facility: CLINIC | Age: 54
LOS: 1 days | Discharge: HOME | DRG: 885 | End: 2024-04-03
Attending: PSYCHIATRY & NEUROLOGY | Admitting: STUDENT IN AN ORGANIZED HEALTH CARE EDUCATION/TRAINING PROGRAM
Payer: COMMERCIAL

## 2024-04-02 DIAGNOSIS — F41.1 GENERALIZED ANXIETY DISORDER: Primary | ICD-10-CM

## 2024-04-02 PROBLEM — R45.851 DEPRESSION WITH SUICIDAL IDEATION: Status: ACTIVE | Noted: 2024-04-02

## 2024-04-02 PROBLEM — F32.A DEPRESSION WITH SUICIDAL IDEATION: Status: ACTIVE | Noted: 2024-04-02

## 2024-04-02 LAB
ALBUMIN SERPL BCG-MCNC: 4.1 G/DL (ref 3.5–5.2)
ALP SERPL-CCNC: 77 U/L (ref 40–150)
ALT SERPL W P-5'-P-CCNC: 24 U/L (ref 0–50)
ANION GAP SERPL CALCULATED.3IONS-SCNC: 14 MMOL/L (ref 7–15)
AST SERPL W P-5'-P-CCNC: 25 U/L (ref 0–45)
BILIRUB SERPL-MCNC: 0.4 MG/DL
BUN SERPL-MCNC: 15.6 MG/DL (ref 6–20)
CALCIUM SERPL-MCNC: 9.1 MG/DL (ref 8.6–10)
CHLORIDE SERPL-SCNC: 106 MMOL/L (ref 98–107)
CHOLEST SERPL-MCNC: 260 MG/DL
CREAT SERPL-MCNC: 1.13 MG/DL (ref 0.51–0.95)
DEPRECATED HCO3 PLAS-SCNC: 23 MMOL/L (ref 22–29)
EGFRCR SERPLBLD CKD-EPI 2021: 58 ML/MIN/1.73M2
FOLATE SERPL-MCNC: 9.1 NG/ML (ref 4.6–34.8)
GLUCOSE SERPL-MCNC: 86 MG/DL (ref 70–99)
HBA1C MFR BLD: 5.1 %
HDLC SERPL-MCNC: 66 MG/DL
LDLC SERPL CALC-MCNC: 170 MG/DL
NONHDLC SERPL-MCNC: 194 MG/DL
POTASSIUM SERPL-SCNC: 4.2 MMOL/L (ref 3.4–5.3)
PROT SERPL-MCNC: 6.7 G/DL (ref 6.4–8.3)
SODIUM SERPL-SCNC: 143 MMOL/L (ref 135–145)
TRIGL SERPL-MCNC: 120 MG/DL
VIT B12 SERPL-MCNC: 273 PG/ML (ref 232–1245)

## 2024-04-02 PROCEDURE — G0378 HOSPITAL OBSERVATION PER HR: HCPCS

## 2024-04-02 PROCEDURE — 90834 PSYTX W PT 45 MINUTES: CPT

## 2024-04-02 PROCEDURE — 36415 COLL VENOUS BLD VENIPUNCTURE: CPT

## 2024-04-02 PROCEDURE — 83036 HEMOGLOBIN GLYCOSYLATED A1C: CPT

## 2024-04-02 PROCEDURE — 82247 BILIRUBIN TOTAL: CPT

## 2024-04-02 PROCEDURE — 250N000013 HC RX MED GY IP 250 OP 250 PS 637: Performed by: STUDENT IN AN ORGANIZED HEALTH CARE EDUCATION/TRAINING PROGRAM

## 2024-04-02 PROCEDURE — 124N000002 HC R&B MH UMMC

## 2024-04-02 PROCEDURE — 82607 VITAMIN B-12: CPT

## 2024-04-02 PROCEDURE — 82746 ASSAY OF FOLIC ACID SERUM: CPT

## 2024-04-02 PROCEDURE — 99222 1ST HOSP IP/OBS MODERATE 55: CPT | Mod: AI | Performed by: STUDENT IN AN ORGANIZED HEALTH CARE EDUCATION/TRAINING PROGRAM

## 2024-04-02 PROCEDURE — 80061 LIPID PANEL: CPT

## 2024-04-02 PROCEDURE — 250N000013 HC RX MED GY IP 250 OP 250 PS 637

## 2024-04-02 RX ORDER — LEVOTHYROXINE SODIUM 125 UG/1
125 TABLET ORAL DAILY
Status: DISCONTINUED | OUTPATIENT
Start: 2024-04-02 | End: 2024-04-03 | Stop reason: HOSPADM

## 2024-04-02 RX ORDER — TRAZODONE HYDROCHLORIDE 100 MG/1
100 TABLET ORAL
Status: DISCONTINUED | OUTPATIENT
Start: 2024-04-02 | End: 2024-04-03 | Stop reason: HOSPADM

## 2024-04-02 RX ORDER — HYDROXYZINE HYDROCHLORIDE 25 MG/1
25 TABLET, FILM COATED ORAL EVERY 6 HOURS PRN
Status: DISCONTINUED | OUTPATIENT
Start: 2024-04-02 | End: 2024-04-03 | Stop reason: HOSPADM

## 2024-04-02 RX ORDER — PROGESTERONE 100 MG/1
100 CAPSULE ORAL DAILY
Status: DISCONTINUED | OUTPATIENT
Start: 2024-04-02 | End: 2024-04-03 | Stop reason: HOSPADM

## 2024-04-02 RX ORDER — PRAZOSIN HYDROCHLORIDE 1 MG/1
1 CAPSULE ORAL AT BEDTIME
Status: DISCONTINUED | OUTPATIENT
Start: 2024-04-02 | End: 2024-04-03 | Stop reason: SINTOL

## 2024-04-02 RX ORDER — IBUPROFEN 400 MG/1
400 TABLET, FILM COATED ORAL EVERY 6 HOURS PRN
Status: DISCONTINUED | OUTPATIENT
Start: 2024-04-02 | End: 2024-04-03 | Stop reason: HOSPADM

## 2024-04-02 RX ORDER — VENLAFAXINE HYDROCHLORIDE 75 MG/1
225 CAPSULE, EXTENDED RELEASE ORAL AT BEDTIME
Status: DISCONTINUED | OUTPATIENT
Start: 2024-04-02 | End: 2024-04-03 | Stop reason: HOSPADM

## 2024-04-02 RX ORDER — BUSPIRONE HYDROCHLORIDE 10 MG/1
10 TABLET ORAL 2 TIMES DAILY
Status: DISCONTINUED | OUTPATIENT
Start: 2024-04-02 | End: 2024-04-03 | Stop reason: HOSPADM

## 2024-04-02 RX ORDER — ACETAMINOPHEN 325 MG/1
650 TABLET ORAL EVERY 4 HOURS PRN
Status: DISCONTINUED | OUTPATIENT
Start: 2024-04-02 | End: 2024-04-03 | Stop reason: HOSPADM

## 2024-04-02 RX ORDER — HYDROXYZINE HYDROCHLORIDE 50 MG/1
50 TABLET, FILM COATED ORAL EVERY 6 HOURS PRN
Status: DISCONTINUED | OUTPATIENT
Start: 2024-04-02 | End: 2024-04-03 | Stop reason: HOSPADM

## 2024-04-02 RX ADMIN — MINOXIDIL 1.25 MG: 2.5 TABLET ORAL at 21:10

## 2024-04-02 RX ADMIN — ACETAMINOPHEN 650 MG: 325 TABLET, FILM COATED ORAL at 21:08

## 2024-04-02 RX ADMIN — VENLAFAXINE HYDROCHLORIDE 225 MG: 75 CAPSULE, EXTENDED RELEASE ORAL at 21:08

## 2024-04-02 RX ADMIN — PRAZOSIN HYDROCHLORIDE 1 MG: 1 CAPSULE ORAL at 21:08

## 2024-04-02 RX ADMIN — IBUPROFEN 400 MG: 400 TABLET, FILM COATED ORAL at 10:30

## 2024-04-02 RX ADMIN — BUSPIRONE HYDROCHLORIDE 10 MG: 10 TABLET ORAL at 21:08

## 2024-04-02 RX ADMIN — PROGESTERONE 100 MG: 100 CAPSULE ORAL at 08:08

## 2024-04-02 RX ADMIN — HYDROXYZINE HYDROCHLORIDE 25 MG: 25 TABLET, FILM COATED ORAL at 15:23

## 2024-04-02 RX ADMIN — LEVOTHYROXINE SODIUM 125 MCG: 125 TABLET ORAL at 08:08

## 2024-04-02 ASSESSMENT — ACTIVITIES OF DAILY LIVING (ADL)
LAUNDRY: WITH SUPERVISION
ADLS_ACUITY_SCORE: 30
ADLS_ACUITY_SCORE: 35
ADLS_ACUITY_SCORE: 30
HYGIENE/GROOMING: SHOWER;INDEPENDENT
ADLS_ACUITY_SCORE: 30
ADLS_ACUITY_SCORE: 35
ORAL_HYGIENE: INDEPENDENT
ADLS_ACUITY_SCORE: 30
ADLS_ACUITY_SCORE: 30
DRESS: INDEPENDENT
ADLS_ACUITY_SCORE: 30

## 2024-04-02 NOTE — ED NOTES
Pt was accepted at [facility]. Pt report given to SIMONE Waterman. Awaiting on transport.

## 2024-04-02 NOTE — PLAN OF CARE
" INITIAL PSYCHOSOCIAL ASSESSMENT AND NOTE    Information for assessment was obtained from:       [x]Patient     []Parent     []Community provider    [x]Hospital records   []Other     []Guardian       Presenting Problem:  Patient is a 53 year old female who uses she/her. Patient was admitted to [facility] on 4/2/2024 [facility] voluntarily.    Presenting issues and presentation for admit: Patient presented to the ED due to concerns for worsening depression and SI with multiple plans to end her life. This is patient's first inpatient admission. There are no concerns for SARAHY.     Per H&P:  Pt interviewed in her room today with treatment team. She is \"ok\" today. Denies SI today. Has had SI on and off throughout life, no prior attempts. Effexor for many years, previously tried cymbalta and wellbutrin. Wellbutrin was stopped because she was crying all the time. Feels her depressive episode has been gradual in onset. Tried to decrease effexor dose but had recurrent depressive symptoms. Feels effexor is helpful but dont resolve her depression. Started on effexor 10/2020.      Since having a covid infection, feels she has not recovered fully, and this has worsened her depression. Work is stressful. 2 kids 11 and 14,  is gone frequently as a . Emotional needs of the family fall on her shoulders. Work stress was a trigger.     Works at [employer], she loves job but she received an email in October 2023 that was upsetting and then multiple changes at work happened, she feels her depressive symptoms have been slowly getting worse since then.      Endorses anxiety, racing thoughts, ruminations. Tried ativan after covid infection triggered her anxiety. Has ativan at home but rarely takes it. Effexor does not help anxiety.      She went on vacation to [email or web address] last week and she went to see mom's side of the family which was an additional trigger for her because her " "mom  with she was 18.      History of eating disorder, binging and restricting, received treatment [id number]. Started on ozempic, binging behavior has decreased with this med but still present. Binging behavior is more related to stress and anxiety, vs her depression.      Goals for hospitalization would be med stabilization and intensive outpatient therapy. Wants to address her PTSD as this is a trigger and impacts her ability to regulate her emotions. Endorses nightmares related to past trauma, happening \"a lot\" right now, when she is \"doing ok\" she will have nightmares about once a month, currently happening every night right now, and this wakes her from sleep. Denies avoiding emotional triggers. Sensitivity to loud noises, easily startled, hypervigilent in awareness of surroundings. Low self esteem and body dysmorphia, has struggled with this whole life.      Support -  ([place]), 2 kids, brother, sister in law, best friend.  is home with kids now. Brother is going to help watch kids when  goes back to work. Doing couples counseling with . Last CBT .         The following areas have been assessed:    History of Mental Health and Chemical Dependency:  Mental Health History:  Patient has a historical diagnosis of MDD, VASU, PTSD, and binge-restrict eating disorder.   The patient has not a history of suicide attempts .   Patient  has not a history of engaged in non-suicidal self-injury.     Previous psychiatric hospitalizations and treatments (including outpatient, residential, and inpatient care:  None    Substance Use History  Cannabis: Endorses, last use 25     Illicit Substances: Denies  current or past addiction     Chemical Dependency Treatment: Denies history of chemical dependency treatment       Patient's current relationship status is   .   Patient reported having two child(amaya).       Family Description (Constellation, significant information and events, " Family Psychiatric History):   Her father has a history of depression     Significant Medical issues, Life events or Trauma history:   Her mother  from plastic surgery when she was 19 years old.       Living Situation:  Patient's current living/housing situation is staying in own home/apartment. They live with her  and children they report that housing is stable and they are able to return upon discharge.       Educational Background:    Patient's highest education level was high school graduate. Patient reports they are  able to understand written materials.     Occupational and Financial Status:     Patient is currently employed full time and reports they are not able to function appropriately at work..  Patient reports  income is obtained through employment.  Patient does not identify finances as a current stressor. They are insured under [employer]. Restrictions (No/Yes): No    Occupational History: Works full-time in sales for [employer]     Legal Concerns (current or past history):       Current Concerns: Denies     Past History: Denies       Legal Status:  Voluntary   County: [place]       Service History: None    Ethnic/Cultural/Spiritual considerations:   The patient describes their cultural background as White/, heterosexual, female.  Contextual influences on patient's health include severity of symptoms, safety concerns, and level of functioning.   Patient identified their preferred language to be English. Patient reported they do not need the assistance of an .  Spiritual considerations include: None    Social Functioning (organizations, interests, support system):   In their free time, patient reports they like to no assessed       Patient identified siblings as part of their support system.  Patient identified the quality of these relationships as good.       Current Treatment Providers are:  Primary Care Provider:  Name/Clinic: Starla Cramer  contact information (family, friends, SO) and CHRISTA status: Jase Fox:       GOALS FOR HOSPITALIZATION:  What do patient want to accomplish during this hospitalization to make things better for the patient.?   Patient priorities:  The patient reported that what is most important to them is feel safe with herself. They identified be on right medications and referrals as a goal of this hospitalization.    Social Service Assessment/Plan:    Patient will have psychiatric assessment and medication management by the psychiatrist. Medications will be reviewed and adjusted per DO/MD/APRN CNP as indicated. The treatment team will continue to assess and stabilize the patient's mental health symptoms with the use of medications and therapeutic programming. Hospital staff will provide a safe environment and a therapeutic milieu. Staff will continue to assess patient as needed. Patient will participate in unit groups and activities. Patient will receive individual and group support on the unit.      CTC will do individual inpatient treatment planning and after care planning. CTC will discuss options for increasing community supports with the patient. CTC will coordinate with outpatient providers and will place referrals to ensure appropriate follow up care is in place.

## 2024-04-02 NOTE — PLAN OF CARE
Problem: Anxiety  Goal: Anxiety Reduction or Resolution  Outcome: Progressing   Goal Outcome Evaluation:  Patient alert and able to make needs known, out in [place] for meals only, withdrawn to self endorsing high anxiety and pain in lower back. PRN Ibuprofen given with minimal effect, PRN Hydroxyzine 25 mg given at 15:22 for anxiety 8/10. Patient rated depression 5/10, denied SI, SIB, HI and hallucinations, intake and hygiene is adequate. Spend most of the shift in room reading and sleeping this shift, no safety or behavioral concern noted.

## 2024-04-02 NOTE — PLAN OF CARE
BEH IP Unit Acuity Rating Score (UARS)  Patient is given one point for every criteria they meet.    CRITERIA SCORING   On a 72 hour hold, court hold, committed, stay of commitment, or revocation. 0    Patient LOS on BEH unit exceeds 20 days. 0  LOS: 0   Patient under guardianship, 55+, otherwise medically complex, or under age 11. 0   Suicide ideation without relief of precipitating factors. 1   Current plan for suicide. 1   Current plan for homicide. 0   Imminent risk or actual attempt to seriously harm another without relief of factors precipitating the attempt. 0   Severe dysfunction in daily living (ex: complete neglect for self care, extreme disruption in vegetative function, extreme deterioration in social interactions). 1   Recent (last 7 days) or current physical aggression in the ED or on unit. 0   Restraints or seclusion episode in past 72 hours. 0   Recent (last 7 days) or current verbal aggression, agitation, yelling, etc., while in the ED or unit. 0   Active psychosis. 0   Need for constant or near constant redirection (from leaving, from others, etc).  0   Intrusive or disruptive behaviors. 0   Patient requires 3 or more hours of individualized nursing care per 8-hour shift (i.e. for ADLs, meds, therapeutic interventions). 0   TOTAL 3

## 2024-04-02 NOTE — PLAN OF CARE
04/02/24 0634   Patient Belongings   Did you bring any home meds/supplements to the hospital?  Yes   Disposition of meds  Sent to security/pharmacy per site process   Patient Belongings locker;sent to security per site process   Patient Belongings Put in Hospital Secure Location (Security or Locker, etc.) cell phone/electronics;cash/credit card;clothing;medication(s);purse/wallet;shoes   Belongings Search Yes   Clothing Search Yes   Second Staff Meti     IN LOCKER:  Knit sweatshirt  Pants  Jacket  Oyster card  Pink shoes  Shirt  2 magazines  Bottle of water  Candy  Spiral bound notepad  Sock  Bra  Hairbrush  Toothbrush  Headphones in gray case  Wireless headphones in case   w/2 cables  2 items of makeup  Phone  Arnett wallet  Green bag    TO SECURITY:  Alley Visa 1337  [employer] Visa 0497  Crate MasterCard 1015  Wings Visa 1270  [employer] 4006    MEDICATION  A               Admission:  I am responsible for any personal items that are not sent to the safe or pharmacy.  [facility] is not responsible for loss, theft or damage of any property in my possession.    Signature:  _________________________________ Date: _______  Time: _____                                              Staff Signature:  ____________________________ Date: ________  Time: _____      2nd Staff person, if patient is unable/unwilling to sign:    Signature: ________________________________ Date: ________  Time: _____     Discharge:  [facility] has returned all of my personal belongings:    Signature: _________________________________ Date: ________  Time: _____                                          Staff Signature:  ____________________________ Date: ________  Time: _____

## 2024-04-02 NOTE — ED NOTES
EMS arrived. Pt was informed. VS taken - within normal limits. Pt is cooperative with transfer. Pt was placed on a stretcher. All belongings were returned to the patient. Pt was escorted out of the unit.

## 2024-04-02 NOTE — PLAN OF CARE
"Individual Therapy Note      Date of Service: April 2, 2024    Patient: Eduarda goes by \"Eduarda,\" uses she/her pronouns    Individuals Present: Dot Moreno GEORGE    Session start: 1200  Session end: 1245  Session duration in minutes: 45    Patient Active Problem List   Diagnosis    Suicidal ideation    Severe recurrent major depression without psychotic features (H)    Posttraumatic stress disorder    Generalized anxiety disorder    Depression with suicidal ideation         Modality Used:CBT, Person Centered, and Rapport Building    Goals: Engage in IOP or PHP, take a leave of absence from work, trauma therapy to process childhood trauma    Patient Description of current symptoms: Tired, more stable, denies SI     Mental Status Exam:   Attitude: cooperative  Eye Contact: good  Mood: sad , depressed, and good  Affect: appropriate and in normal range and full range  Speech: clear, coherent  Psychomotor Behavior: no evidence of tardive dyskinesia, dystonia, or tics  Thought Process:  logical and linear  Associations: no loose associations  Thought Content: no evidence of suicidal ideation or homicidal ideation  Insight: good  Judgement: fair  Attention Span and Concentration: intact    Pt progress: Pt was in her room sleeping when writer approached, was agreeable meeting 1:1 in interview room. Pt reports she has been sleeping a lot, which is abnormal for her, and is starting is feel more stable. Reports this experience was a wake up call for her and her family. Pt reports she has been in therapy for many years but has not really address root of her issues which is childhood trauma. 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 Pt reports she is very sensitive to criticism which causes her to spiral and feel intense emotions out of proportion to context. Writer validated her " feelings, explored how Pt was made to feel as a child - not good enough therefore unworthy of love. Pt shared that she mourns who she could have been if she had a more stable childhood. Assisted Pt to developed treatment goals including emotional regulation for intense emotions and building self confidence. Pt was engaged, motivated to make changes.    Treatment Objective(s) Addressed:   The focus of this session was on rapport building, orienting the patient to therapy, safety planning, and identifying treatment goals     Progress Towards Goals and Assessment of Patient:   Unable to assess progress towards goals - first meeting with Pt.     Therapeutic Intervention(s):   Provided active listening, unconditional positive regard, and validation. Engaged in safety planning.  Coached on coping techniques/relaxation skills to help improve distress tolerance and managing intense emotions. Identified and practiced coping skills.    Plan/next step: Writer will continue to check in with Pt, encouraged Pt to attend group sessions.       [id number] - Psychotherapy (with patient) - 45 (38-52*) min

## 2024-04-02 NOTE — PROVIDER NOTIFICATION
04/02/24 1302   Individualization/Patient Specific Goals   Patient Personal Strengths motivated for recovery;motivated for treatment;stable living environment   Patient Vulnerabilities lacks insight into illness;poor impulse control   Anxieties, Fears or Concerns SI concerns   Individualized Care Needs None   Patient/Family-Specific Goals (Include Timeframe) Feel safe with self and be on right medications   Interprofessional Rounds   Summary Reason for admission   Participants nursing;psychiatrist;other (see comments);CTC   Behavioral Team Discussion   Participants Dr. Hendricks, Psychiatry Residents, 3rd year Med Students, Adrianna RN, Peggy ARMAS, Izabel [facility]   Progress Initial assessment   Anticipated length of stay 3-5 days   Continued Stay Criteria/Rationale Admitted for worsening depression and SI with plans   Medical/Physical No acute concerns   Precautions See below   Plan Psychiatry Team will meet with patient daily to assess psychiatric needs and to discuss medication options/side effects; during hospitalization patient will be encouraged to attend therapy groups and to participate in unit programming. CTC will coordinate disposition and aftercare plan   Rationale for change in precautions or plan None   Safety Plan See below, per unit protocol   Anticipated Discharge Disposition home with family     PRECAUTIONS AND SAFETY    Behavioral Orders   Procedures    Code 1 - Restrict to Unit    Routine Programming     As clinically indicated    Status 15     Every 15 minutes.    Suicide precautions: Suicide Risk: LOW; Clinical rationale to override score: Exhibiting Suicidal/self-harm behaviors or thoughts     Patients on Suicide Precautions should have a Combination Diet ordered that includes a Diet selection(s) AND a Behavioral Tray selection for Safe Tray - with utensils, or Safe Tray - NO utensils       Order Specific Question:   Suicide Risk     Answer:   LOW     Order Specific Question:   Clinical rationale to  override score:     Answer:   Exhibiting Suicidal/self-harm behaviors or thoughts

## 2024-04-02 NOTE — ED NOTES
Pt has been resting through the majority of the shift in her chair. Pt is still feeling suicidal and continues to have thoughts about how she would do this but has noticed that these thoughts are less intense. Pt reports feeling more numb and attributes this to not having any external stressors. Pt is fairly  guarded and withdrawn but appears to be relaxed and comfortable. Pt was made aware of the plan of going IP at [place] [id number] and is in agreement.

## 2024-04-02 NOTE — PHARMACY-ADMISSION MEDICATION HISTORY
Pharmacist Admission Medication History    Admission medication history is complete. The information provided in this note is only as accurate as the sources available at the time of the update.    Information Source(s): Patient via phone    Pertinent Information:   - Estradiol Patch: confirms Mon/Thurs; confirms she currently is wearing   - Ozempic: confirms Mondays; is overdue but patient states she brought to hospital. Advised to discuss with MD about ordering if appropriate   - Minoxidil: patient confirms taking + dose; pays cash for medication so this does not show up in SureScript reporting     Changes made to PTA medication list:  Added: None  Deleted: None  Changed: None    Allergies reviewed with patient and updates made in EHR: yes    Medication History Completed By: ANGY MORELOS [facility] 4/2/2024 4:40 PM    PTA Med List   Medication Sig Last Dose    estradiol (VIVELLE-DOT) 0.075 MG/24HR BIW patch Place 1 patch onto the skin twice a week On Monday's and Thursday's Past Week    levothyroxine (SYNTHROID/LEVOTHROID) 125 MCG tablet Take 1 tablet by mouth daily before breakfast Past Week    minoxidil (LONITEN) 2.5 MG tablet Take 1.25 mg by mouth daily Past Week    progesterone (PROMETRIUM) 100 MG capsule Take 100 mg by mouth daily Past Week    Semaglutide, 1 MG/DOSE, (OZEMPIC) 4 MG/3ML pen Inject 1 mg Subcutaneous every 7 days On Monday's 3/25/2024    traZODone (DESYREL) 100 MG tablet Take 100 mg by mouth at bedtime Past Week    venlafaxine (EFFEXOR XR) 150 MG 24 hr capsule Take 150 mg by mouth daily Past Week    venlafaxine (EFFEXOR XR) 75 MG 24 hr capsule Take 75 mg by mouth daily Past Week

## 2024-04-02 NOTE — PLAN OF CARE
"  Problem: Sleep Disturbance  Goal: Adequate Sleep/Rest  Outcome: Progressing   Goal Outcome Evaluation:                                                                                                  ADMISSION NOTE    Situation  Eduarda Pearl is a 53 year old female arriving to [facility] Unit [facility] from [facility] for worsening symptoms of depression and PTSD leading to thoughts of suicide with a plan to drive her car     Legal Status  Pt admitted under a voluntary status    Background  Patient has a history of major depressive disorder, generalized anxiety disorder, PTSD related to childhood trauma, and binge-restrict eating disorder.     She has had no prior inpatient psychiatric admission.     Assessment  Patient presents to unit with calm and slightly anxious affect. Reports mood as \"Very tired and a little numb\". Rated anxiety 5/10. Pt currently denies SI/SIB/HI/AVH. Reports mild acute back pain (rating 4/10). Declined interventions for pain. Denies any other physical or psych concerns.     She reports occasional (monthly or less) marijuana use. Endorses social alcohol use. Denies any other substance use.     Vitals  /83 (BP Location: Left arm, Patient Position: Right side, Cuff Size: Adult Regular)   Pulse 72   Temp 97  F (36.1  C) (Temporal)   Resp 16   Wt 97.9 kg (215 lb 14.4 oz)   SpO2 93%   BMI 38.24 kg/m         Response  Patient placed in N208/N208-02 at this time. Given admission packet and wrist band. Bill of rights explained. Oriented to unit. Patient cooperative with all of admission process. Denied further questions or concerns.     Pt appears to be sleeping for remainder of shift (2.75 hours so far) with no concerns. Will continue to monitor and provide interventions as necessary.      "

## 2024-04-03 VITALS
RESPIRATION RATE: 18 BRPM | SYSTOLIC BLOOD PRESSURE: 106 MMHG | HEART RATE: 86 BPM | TEMPERATURE: 97.1 F | DIASTOLIC BLOOD PRESSURE: 69 MMHG | OXYGEN SATURATION: 98 % | WEIGHT: 215.9 LBS | BODY MASS INDEX: 38.24 KG/M2

## 2024-04-03 PROBLEM — R45.851 SUICIDAL IDEATION: Status: RESOLVED | Noted: 2024-03-31 | Resolved: 2024-04-03

## 2024-04-03 PROBLEM — F32.A DEPRESSION WITH SUICIDAL IDEATION: Status: RESOLVED | Noted: 2024-04-02 | Resolved: 2024-04-03

## 2024-04-03 PROBLEM — R45.851 DEPRESSION WITH SUICIDAL IDEATION: Status: RESOLVED | Noted: 2024-04-02 | Resolved: 2024-04-03

## 2024-04-03 PROCEDURE — 250N000013 HC RX MED GY IP 250 OP 250 PS 637: Performed by: STUDENT IN AN ORGANIZED HEALTH CARE EDUCATION/TRAINING PROGRAM

## 2024-04-03 PROCEDURE — 99238 HOSP IP/OBS DSCHRG MGMT 30/<: CPT | Mod: GC | Performed by: PSYCHIATRY & NEUROLOGY

## 2024-04-03 PROCEDURE — 250N000013 HC RX MED GY IP 250 OP 250 PS 637

## 2024-04-03 RX ORDER — BUSPIRONE HYDROCHLORIDE 10 MG/1
10 TABLET ORAL 2 TIMES DAILY
Qty: 60 TABLET | Refills: 0 | Status: SHIPPED | OUTPATIENT
Start: 2024-04-03

## 2024-04-03 RX ADMIN — ACETAMINOPHEN 650 MG: 325 TABLET, FILM COATED ORAL at 04:58

## 2024-04-03 RX ADMIN — PROGESTERONE 100 MG: 100 CAPSULE ORAL at 07:38

## 2024-04-03 RX ADMIN — ACETAMINOPHEN 650 MG: 325 TABLET, FILM COATED ORAL at 10:09

## 2024-04-03 RX ADMIN — BUSPIRONE HYDROCHLORIDE 10 MG: 10 TABLET ORAL at 07:38

## 2024-04-03 RX ADMIN — LEVOTHYROXINE SODIUM 125 MCG: 125 TABLET ORAL at 07:38

## 2024-04-03 ASSESSMENT — ACTIVITIES OF DAILY LIVING (ADL)
ADLS_ACUITY_SCORE: 30

## 2024-04-03 NOTE — PLAN OF CARE
Problem: Suicide Risk  Goal: Absence of Self-Harm  Outcome: Adequate for Care Transition     Problem: Anxiety  Goal: Anxiety Reduction or Resolution  Outcome: Adequate for Care Transition   Goal Outcome Evaluation:    Patient presented with full range affect, pleasant and cooperative with medications and cares, denied all mental health symptoms and is selina for safety. Patient verbalized readiness to discharge, has good insight to health and plans to start outpatient therapy, per patient, family are involved with accessing resources. After patient met with team, plan  for patient to discharge home with outpatient resources.   Discharge information reviewed and patient stated understanding, patient went home with all belongings and home medications, will  new script from [facility] home pharm. Patient left Unit at 3 pm family transport.

## 2024-04-03 NOTE — PLAN OF CARE
Problem: Adult Behavioral Health Plan of Care  Goal: Plan of Care Review  Outcome: Progressing  Flowsheets (Taken 4/2/2024 1700)  Patient Agreement with Plan of Care: agrees   Goal Outcome Evaluation:    Plan of Care Reviewed With: patient          Pt was visible in the lounge socializing and interacting with selected peers. She was observed watching video with peers. Affect was bright and pleasant. Hygiene was good. She took a shower this evening. Intake was adequate. She was out for dinner and 4 pm snacks. She ate 100% but she refuse HS snacks stating that she was not hungry. She was able to make her needs known. She was cooperative with assessment. She denied pain and all mental health psych symptoms. She contracted for safety. She was medication compliant. At bed time, she complained of back pain 6/10. She was given prn Tylenol 650 mg which she said was helpful. Her vitals checked and b/p was 147/87 and after taking her medications it was rechecked for 106/69. No safety concern noted at this time. No medication side effect reported or observed this shift. Will continue to monitor and will assist if need arise.

## 2024-04-03 NOTE — DISCHARGE INSTRUCTIONS
Behavioral Discharge Planning and Instructions    Summary: You were admitted on 4/2/2024  due to  worsening depression and SI .  You were treated by Martine Lagos MD and discharged on 4/3/2024 from [facility]    Main Diagnosis: MDD, PTSD, and VASU    Commitment:  Voluntary    Health Care Follow-up:       Therapy  Date/time: Tuesday April 9th, 2024 @ 7:00 AM In person  Provider:  Milady ALVAREZ  Address: [address] [address]  Phone: [phone]   Fax: [phone]   Note:   Intake paperwork to be completed beforehand will be sent to [employer]. Please arrive 20 minutes early if you prefer to complete paperwork in person.     Psychiatry   Date/time: Wednesday April 17th, 2024 @ 10:00 AM  In person  Provider:  Houston Guadarrama Physician Assistant  Address: [address] [address]  Phone: [phone]   Fax: [phone]   Note:   This is a 75 minute appointment. Intake paperwork to be completed beforehand will be sent to [employer]. Please arrive 30 minutes early if you prefer to complete paperwork in person.   Follow up:  Tuesday May 14th, 2024 @ 10:00 AM  In person   ** HUC to fax AVS upon discharge, please.         Day Treatment:  If you are interested in attending IOP at [facility], please call:   Located in [facility] Floor [address] ; [address] [place] [address]   Phone: [phone] or [phone]         Information will be faxed to your outpatient providers to ensure a healthy continuity of care for you.     Attend all scheduled appointments with your outpatient providers. Call at least 24 hours in advance if you need to reschedule an appointment to ensure continued access to your outpatient providers.     Major Treatments, Procedures and Findings:  You were  provided with: a psychiatric assessment, assessed for medical stability, medication evaluation and/or management, group therapy, and milieu management    Symptoms to Report: feeling more aggressive, increased confusion, losing more sleep, mood getting worse, or thoughts of suicide    Early warning signs can include: increased depression or anxiety sleep disturbances increased thoughts or behaviors of suicide or self-harm     Safety and Wellness:  Take all medicines as directed.  Make no changes unless your doctor suggests them.      Follow treatment recommendations.  Refrain from alcohol and non-prescribed drugs.  Ask your support system to help you reduce your access to items that could harm yourself or others. Items could include:  Firearms  Medicines (both prescribed and over-the-counter)  Knives and other sharp objects  Ropes and like materials  Car keys  If there is a concern for safety, call 911. If there is a concern for safety, call 911.    Resources:   Crisis Intervention: 802.419.5730 or 431-563-6927 (TTY: 381.899.4935).  Call anytime for help.  National [place] on Mental Illness (www.[place].rigoberto.org): 300.993.7556 or 829-533-4821.  [place] Association for Children's Mental Health (www.macmh.org): 521.666.8701.  Alcoholics Anonymous (www.alcoholics-anonymous.org): Check your phone book for your local chapter.  Suicide Awareness Voices of Education (SAVE) (www.save.org): 212-598-XQLT (1383)  National Suicide Prevention Line (www.mentalhealthmn.org): 966-289-VQNN (2796)  Mental Health Consumer/Survivor Network of [place] (www.mhcsn.net): 391.215.8360 or 615-803-8899  Mental Health Association of [place] (www.mentalhealth.org): 769.874.5029 or 347-306-5863  Self- Management and Recovery Training., SMART-- Toll free: 357.389.9090  www.behaview.org  [facility] Crisis Response 175 208-5629  [facility] Crisis Response 346-755-3136  [facility] Crisis Response 836-220-5288  [facility] [place] Crisis (COPE) Response  "- Adult 017 451-1046  [facility] Crisis Response - Adult 964 141-8768  Text 4 Life: txt \"LIFE\" to 15412 for immediate support and crisis intervention  Crisis text line: Text \"[place]\" to 089711. Free, confidential, 24/7.  Crisis Intervention: 994.496.6670 or 347-906-1902. Call anytime for help.   [facility] Mental Health Crisis Team - Child: 270.855.8789  [facility] Children's Mental Health Crisis Response Team - Child: 268.906.2376       [facility] (National [place] on Mental Illness) improves the lives of children and adults with mental illnesses and their families by providing free classes on mental illnesses and support groups for adults with mental illnesses, parents and family members. For more information: Phone: 469.713.9235 Toll free: 4-073-VMSL-HELPS Website: www.[employer].orghttp://www.namUniversity Hospitals St. John Medical Centerps.org/      General Medication Instructions:   See your medication sheet(s) for instructions.   Take all medicines as directed.  Make no changes unless your doctor suggests them.   Go to all your doctor visits.  Be sure to have all your required lab tests. This way, your medicines can be refilled on time.  Do not use any drugs not prescribed by your doctor.  Avoid alcohol.    Advance Directives:   Scanned document on file with [facility]? No scanned doc  Is document scanned? Pt states no documents  Honoring Choices Your Rights Handout: Informed and given  Was more information offered? Pt declined    The Treatment team has appreciated the opportunity to work with you. If you have any questions or concerns about your recent admission, you can contact the unit which can receive your call 24 hours a day, 7 days a week. They will be able to get in touch with a Provider if needed. The unit number is [phone] .   "

## 2024-04-03 NOTE — PLAN OF CARE
Psycho-education contact: This patient arrived the psycho-education group tonight. Left the group while a patient was asking a question, shortly after introductions were completed.  during introductions.

## 2024-04-03 NOTE — PLAN OF CARE
BEH IP Unit Acuity Rating Score (UARS)  Patient is given one point for every criteria they meet.    CRITERIA SCORING   On a 72 hour hold, court hold, committed, stay of commitment, or revocation. 0    Patient LOS on BEH unit exceeds 20 days. 0  LOS: 1   Patient under guardianship, 55+, otherwise medically complex, or under age 11. 0   Suicide ideation without relief of precipitating factors. 1   Current plan for suicide. 1   Current plan for homicide. 0   Imminent risk or actual attempt to seriously harm another without relief of factors precipitating the attempt. 0   Severe dysfunction in daily living (ex: complete neglect for self care, extreme disruption in vegetative function, extreme deterioration in social interactions). 1   Recent (last 7 days) or current physical aggression in the ED or on unit. 0   Restraints or seclusion episode in past 72 hours. 0   Recent (last 7 days) or current verbal aggression, agitation, yelling, etc., while in the ED or unit. 0   Active psychosis. 0   Need for constant or near constant redirection (from leaving, from others, etc).  0   Intrusive or disruptive behaviors. 0   Patient requires 3 or more hours of individualized nursing care per 8-hour shift (i.e. for ADLs, meds, therapeutic interventions). 0   TOTAL 3

## 2024-04-03 NOTE — PLAN OF CARE
Problem: Sleep Disturbance  Goal: Adequate Sleep/Rest  Outcome: Progressing   Goal Outcome Evaluation:  Patient was observed to have slept for 7 hours. No acute events during the shift. Requested and received prn Tylenol for back pain, asleep during follow-up assessment.. Respirations regular and non-labored. Routine safety checks in place q 15 minutes.

## 2024-04-03 NOTE — DISCHARGE SUMMARY
----------------------------------------------------------------------------------------------------------  [facility]   Psychiatric Discharge Summary      Eduarda Pearl MRN# [id number]   Age: 53 year old YOB: 1970     Date of Admission:  2024  Date of Discharge:  4/3/2024  Admitting Physician:  Dr. Adri Hendricks  Discharge Physician:  Dr. Martine Lagos MD    This document serves as a transfer of care to Eduarda Pearl's outpatient providers.     Events Leading to Hospitalization:     Eduarda Pearl is a 53 year old female with previous psychiatric diagnoses of MDD, VASU, PTSD, and binge-restrict eating disorder admitted from the OS on 2024 due to concern for SI in the context of psychosocial stressors including work related stress, sick family members, and recent menopause onset.     ED Provider Note:  Eduarda Pearl is a 53 year old female with history of anxiety and depression presenting today with suicidal ideations.  Patient has had ongoing depression that she has been addressing with her primary care doctor, but worsening over the last two months.  She states that she has had suicidal plan including drug overdose and driving into something.  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  She reports insomnia at night has been taking trazodone with no improvement.  She denies any daily alcohol use.  Reports occasional marijuana use.  Her father has a history of depression and states that her mother  from plastic surgery when she was 19 years old.  She has had no prior empath admissions or inpatient psych admissions.     [facility]/DEC Assessment:  Eduarda Pearl presents to the ED with with her brother for worsening suicidal ideation,  "Depression, Anxiety.  She reported often has \"low grade depression\" that began to significantly worsen in October 2023. She identifies multiple psychosocial stressors including: experiencing menopause, an unpredictable workplace, a new job with increased demands for less pay, and spending time with her mother's side of the family which resurfacing childhood trauma. Her father is also dying of prostate cancer, which has further increased PTSD symptoms related to her childhood trauma.  On Thursday (3/28/24), she started to have intense thoughts about how \"easy\" it would be to end her life and drove to her brother's house for help. Her brother and sister in-law have been monitoring her SI and encouraged her to seek help today. She has suicidal plans with intent to overdose on medication or crash her car. She has been researching this week what combination of medications she would need to take for a suicide attempt to be lethal. She also has been thinking about driving fast down a remote road so as to not hurt anyone else and then crashing into a barricade. She also has a fantasy of putting on a fur coat, combining barbiturates with vodka, and then floating away on an ice sheet in the [place], letting hypothermia take her. She is unable to identify any deterrents to suicide at present, including her children. She talks at length about how she cannot \"handle the pain\" anymore and does not think that life will ever get better. Pt endorses PTSD symptoms of \"horrific nightmares\", flashbacks, diassociation, and hypervigilance. She reports racing thoughts without associated panic attacks. She has been having significant difficulty sleeping, falling asleep at 1 am and then waking up between 3 and 5 am. She has a history of binging and restricting and has been restricting more frequently. She denies SARAHY but reports using marijuana on a monthly basis or less. She denies HI.  Collateral information was obtained from her brother, " "Jase Fox:  Pt has had significant depressive symptoms since at least 3/28/24 and her family decided to seek help for her today.  Pt has struggled with depression for a long time but Jase has never seen pt \"as bad as she has been\" since Thursday (3/28/24). She has been crying uncontrollably and telling us that we would be better off without her. She has not disclosed a suicidal plan.  No SARAHY concerns. Pt has made comments about killing herself. Per 3/31/24 DEC assessment by [facility].  See note from 3/31/24 for more details.    See H&P by Adri Hendricks on 4/2/24 for further information.     Diagnoses:   Primary Psychiatric Diagnosis  Major Depressive disorder, severe, with suicidal ideation and without psychotic features    Secondary Psychiatric Diagnoses  Generalized Anxiety Disorder  Post Traumatic Stress Disorder    Psychiatric Assessment:   Eduarda Pearl is a 53 year old female previously diagnosed with VASU, MDD, PTSD, and binge-restrict eating disorder who presented voluntarily her brother in the context of worsening depression and suicidal ideation since October of 2023, now with a plan and intent. This is her first psychiatric hospitalization. Significant symptoms on admission included suicidal ideation with plan and intent, depressed mood, and anxiety.  Initial evaluation notable for a calm, cooperative patient with good insight.  Eduarda's mental health presentation was complicated by psychosocial and biological stressors such as experiencing menopause, an unpredictable workplace, a new job with increased demands for less pay, and spending time with her mother's side of the family which resurfacing childhood trauma. Her father is also dying of prostate cancer, which has further increased PTSD symptoms related to her childhood trauma.     Given the above and the patient's reported symptoms of suicidal ideation with plan (including no longer feeling that her children are a reason for her to live), depressed " mood, and heightened anxiety in the setting of multiple acute stressors are consistent with a new decompensation of her Major Depressive Disorder, severe, without psychotic features. Patient's history of prior diagnosis in the setting of present/historically worries, ruminations, and fearful thinking are consistent with a prior diagnosis of Generalized Anxiety Disorder Patient's recent triggers of acute trauma with exposure to family -  in the setting of known flashbacks, avoidance behaviors, hyperarousal, and nightmares - are consistent with a prior diagnosis of Post Traumatic Stress Disorder. While patient has historically participated in therapy and expressed benefit, she reports limited focus on trauma-specific evaluation and processing and expressed a strong desire to pursue this and more intensive mental healthcare moving forward.    Patient will likely benefit medication augmentation as well and outpatient referral focused on trauma based therapy and anxiety. Given her SI with plan and decompensation in mood, patient warranted inpatient psychiatric hospitalization to maintain her safety.     Psychiatric Hospital Course:     Eduarda Pearl was admitted to [facility] as a voluntary patient for suicidal ideation.  Her PTA meds were continued, including her Venlafaxine XR 225mg for anxiety and depression, hydroxyzine 25mg q6h PRN for anxiety, and trazodone PRN for sleep.  Her symptoms of PTSD were prominent, reporting increased and nightmares and she was started on Prazosin 1mg at bedtime.  Nightmares were decreased the next day, but unclear if this was related to the Prazosin as she also had a significant concomitant improvement in her overall condition as well.  Given dizziness and lightheadedness on the Prazosin, this was discontinued. Patient was offered the opportunity to remain inpatient to adjust dosing and monitor for improvement however she declined.  She reported benefit with venlafaxine in the past but  noted that when she decided to decrease the medication she noticed worsening symptoms so we decided to augment venlafaxine with Buspar 10mg BID to address both depressive and anxiety symptoms in the setting of concurrent binge-eating disorder.     Level of medication adherence: Excellent, Adherent with all medications  Behaviors: The patient was safe and appropriate and did not require chemical/physical restraints during admission. She was cooperative with cares, had good group attendance, and was visible in the milieu.  Change in psychiatric symptoms: Over the course of this hospitalization the patient's symptoms of depression, anxiety, PTSD, and SI improved.  Eduarda was released to home. At the time of discharge she was determined to not be a danger to herself or others.     Risk Assessment:      Today Eduarda Pearl denies SI, HI, AVH and is otherwise doing well. Patient has notable risk factors for self-harm, including anxiety. However, risk is mitigated by commitment to family, absence of past attempts, ability to volunteer a safety plan, and history of seeking help when needed. Therefore, based on all available evidence including the factors cited above, she does not appear to be at imminent risk for self-harm, does not meet criteria for a 72-hr hold, and therefore remains appropriate for ongoing outpatient level of care. Additional steps taken to minimize risk include: medication optimization, close psychiatric follow up and provision of crisis resources. Voluntary referral for PHP and trauma based therapy was offered, she accepted this offer. Patient's  and brother also were engaged in helping patient find PHP/IOP programs outside of the [place] system.      Psychiatric Examination:     Mental Status Exam:  Oriented to:  Grossly Oriented  General:  Awake and Alert  Appearance:  appears stated age  Behavior/Attitude:  Calm, Cooperative, Engaged, and Open  Eye Contact: Appropriate  Psychomotor: Normal  "and No evidence of tics, dystonia, or tardive dyskinesia  no catatonia present  Speech:  appropriate volume/tone and with good articulation  Language: Fluent in English with appropriate syntax and vocabulary.  Mood:  \"Much better today\"  Affect:  appropriate  Thought Process:  linear, coherent, and goal directed  Thought Content:   No SI/HI/AH/VH; No apparent delusions  Associations:  intact  Insight:  good due to recognizing causes of her worsening symptoms and barriers contributing to prior decompensation  Judgment:  good due to offering up a reasonable and detailed plan for treatment with strong rationale for next steps as well as demonstrated ability to weigh risks vs benefits of discharging or staying inpatient.  Impulse control: good  Attention Span:  grossly intact  Concentration:  grossly intact  Recent and Remote Memory:  grossly intact  Fund of Knowledge:  Normal  Muscle Strength and Tone: normal  Gait and Station: Normal     Medical Hospital Course:   Eduarda Pearl was medically cleared by the ED prior to admission to the unit. PTA medications were continued on admission.  No additional medical diagnoses addressed.      Consults: None.    Labs were notable for the following:  Data this admission:  - CBC with mildly elevated Creatinine (1.13), consistent with past from 2022  - CMP unremarkable  - TSH normal  - UDS negative  - Hgb A1c normal  - Lipids notable for elevated total (260) and LDL (170)  - Vit B12 normal  - Folate normal     Discharge Medications:     Discharge Medication List as of 4/3/2024  2:42 PM        START taking these medications    Details   busPIRone (BUSPAR) 10 MG tablet Take 1 tablet (10 mg) by mouth 2 times daily, Disp-60 tablet, R-0, E-Prescribe           CONTINUE these medications which have NOT CHANGED    Details   estradiol (VIVELLE-DOT) 0.075 MG/24HR BIW patch Place 1 patch onto the skin twice a week On Monday's and Thursday's, Historical      levothyroxine " (SYNTHROID/LEVOTHROID) 125 MCG tablet Take 1 tablet by mouth daily before breakfast, Historical      minoxidil (LONITEN) 2.5 MG tablet Take 1.25 mg by mouth daily, Historical      progesterone (PROMETRIUM) 100 MG capsule Take 100 mg by mouth daily, Historical      Semaglutide, 1 MG/DOSE, (OZEMPIC) 4 MG/3ML pen Inject 1 mg Subcutaneous every 7 days On Monday's, Historical      traZODone (DESYREL) 100 MG tablet Take 100 mg by mouth at bedtime, Historical      !! venlafaxine (EFFEXOR XR) 150 MG 24 hr capsule Take 150 mg by mouth daily, Historical      !! venlafaxine (EFFEXOR XR) 75 MG 24 hr capsule Take 75 mg by mouth daily, Historical       !! - Potential duplicate medications found. Please discuss with provider.           Discharge Plan:   Medications as above  Psychiatric Appointments: Houston Guadarrama Physician Assistant - 4/17 @ [facility]  Psychotherapy Appointments: Milady Locke [facility] 4/9 at 7AM @ [facility]  Day Treatment Referral: IOP information provided to patient at time of discharge  Medical follow up: None     Attestations:     Dean Cassidy, MS3  [facility] Medical Student        I was present with the medical student who participated in the service and documentation of the note. I have verified the history, personally performed the physical/mental status exam, and was responsible for oversight with medical decision making. I agree with the assessment and plan documented in the note above. Patient was also seen and staffed with Dr. Lagos prior to discharge who was in agreement with the above assessment.    Elisabet Min MD  Psychiatry Resident  [facility]    Attestation:   The patient has been seen and evaluated by me,  Martine Lagos MD. I have examined the patient today and reviewed the discharge plan with the resident and medical student. I agree with the final assessment and plan, as noted in the discharge summary. I have reviewed today's vital signs, medications, labs and  imaging.  Total time discharge plannin minutes  Martine Lagos MD ,Ph.D.

## 2024-04-03 NOTE — PLAN OF CARE
Care Coordinator Note(s):    Care Request(s):   Psychiatry   Preferences: Time Frame: 2 Weeks, In Person,  Notes: [facility] or near by    Therapy  Preferences: Time Frame: 1 Week, In Person,   Notes: [facility] or near by      Care Outcome(s):    CC Progress Note(s)/ Documentation:     CM n IP y SARAHY/CD n MM y                    Psychiatry   Date/time: Wednesday April 17th, 2024 @ 10:00 AM  In person  Provider:  Houston Guadarrama Physician Assistant  Address: [address], [address] [place] [address]  Phone: [phone]   Fax: [phone]   Note:   This is a 75 minute appointment. Intake paperwork to be completed beforehand will be sent to [employer]. Please arrive 30 minutes early if you prefer to complete paperwork in person.   Follow up:  Tuesday May 14th, 2024 @ 10:00 AM  In person     Therapy  Date/time: Tuesday April 9th, 2024 @ 7:00 AM In person  Provider:  Milady ALVAREZ  Address: [address], [address] [place] [address]  Phone: [phone]   Fax: [phone]   Note:   Intake paperwork to be completed beforehand will be sent to [employer]. Please arrive 20 minutes early if you prefer to complete paperwork in person.             -Kirsten Diana  Adult Behavioral Health Care Coordinator

## 2024-04-03 NOTE — PLAN OF CARE
"Team Note Due:  Tuesday    Assessment/Intervention/Current Symtoms and Care Coordination:  Chart review and met with team, discussed pt progress, symptomology, and response to treatment.  Discussed the discharge plan and any potential impediments to discharge.    - Patient reports feeling much better than when she came in. She denies SI, reports her anxiety has improved. Reports having a \"break\" has helped to focus on herself. Reports some dizziness from the medication but overall responding well to it.   - Patient will be discharged today.      Discharge Plan or Goal:  Will return home, will follow up with outpatient mental health providers.      Barriers to Discharge:  None     Referral Status:  Will be referred to therapy and psychiatry      Legal Status:  Voluntary    [facility]    Contacts:  Spouse and brother       Upcoming Meetings and Dates/Important Information and next steps:  Discharge planning   "